# Patient Record
Sex: FEMALE | Race: OTHER | Employment: UNEMPLOYED | URBAN - METROPOLITAN AREA
[De-identification: names, ages, dates, MRNs, and addresses within clinical notes are randomized per-mention and may not be internally consistent; named-entity substitution may affect disease eponyms.]

---

## 2019-01-11 ENCOUNTER — INITIAL PRENATAL (OUTPATIENT)
Dept: FAMILY MEDICINE CLINIC | Age: 34
End: 2019-01-11

## 2019-01-11 VITALS
DIASTOLIC BLOOD PRESSURE: 75 MMHG | HEART RATE: 83 BPM | WEIGHT: 123 LBS | RESPIRATION RATE: 16 BRPM | OXYGEN SATURATION: 98 % | TEMPERATURE: 98.2 F | SYSTOLIC BLOOD PRESSURE: 111 MMHG

## 2019-01-11 DIAGNOSIS — Z34.00 SUPERVISION OF NORMAL FIRST PREGNANCY, ANTEPARTUM: Primary | ICD-10-CM

## 2019-01-11 LAB
BILIRUB UR QL STRIP: NEGATIVE
GLUCOSE UR-MCNC: NEGATIVE MG/DL
KETONES P FAST UR STRIP-MCNC: NEGATIVE MG/DL
PH UR STRIP: 7.5 [PH] (ref 4.6–8)
PROT UR QL STRIP: NEGATIVE
SP GR UR STRIP: 1.01 (ref 1–1.03)
UA UROBILINOGEN AMB POC: NORMAL (ref 0.2–1)
URINALYSIS CLARITY POC: NORMAL
URINALYSIS COLOR POC: YELLOW
URINE BLOOD POC: NEGATIVE
URINE LEUKOCYTES POC: NORMAL
URINE NITRITES POC: NEGATIVE

## 2019-01-11 NOTE — PROGRESS NOTES
History and Physical 
 
Patient: Ellis Armstrong MRN: [id number]  SSN: [id number] YOB: 1985  Age: 28 y.o. Sex: female Subjective:  
  
Ellis Armstrong is a 28 y.o. female  at 16.4 by LMP who presents for IOB visit. History reviewed. No pertinent past medical history. History reviewed. No pertinent surgical history. History reviewed. No pertinent family history. Social History Tobacco Use  Smoking status: Never Smoker  Smokeless tobacco: Never Used Substance Use Topics  Alcohol use: No  
  Frequency: Never Prior to Admission medications Medication Sig Start Date End Date Taking? Authorizing Provider PNV [id number]-[id number] 29 mg iron-1 mg -50 mg CPKD Take  by mouth. Yes Provider, Historical  
  
 
No Known Allergies Review of Systems: 
ROS negative except as noted in HPI. Objective:  
 
Vitals:  
 19 1637 BP: 111/75 Pulse: 83 Resp: 16 Temp: 98.2 °F (36.8 °C) TempSrc: Oral  
SpO2: 98% Weight: 123 lb (55.8 kg) Physical Exam: 
See prenatal physical exam. 
 
OB Dating Ultrasound Patient is a 28 y.o. No obstetric history on file. with an estimated gestational age of Unknown by LMP who presents for dating ultrasound. Eb [facility] 81 MEDICINE CTROFFICE PROCEDURE PROGRESS NOTE Chart reviewed for the following: 
 Vandana ETIENNE, DO, have reviewed the History, Physical and updated the Allergic reactions for Angelica Mcneil TIME OUT performed immediately prior to start of procedure: 
 Diya ETIENNE DO, have performed the following reviews on Ulda Leanne Mcneil prior to the start of the procedure: 
         
* Patient was identified by name and date of birth * Agreement on procedure being performed was verified * Risks and Benefits explained to the patient * Procedure site verified and marked as necessary * Patient was positioned for comfort * Consent was signed and verified Time: 4:45pm 
 Date of procedure: 2019 Procedure performed by: Diya Santoro DO How tolerated by patient: tolerated the procedure well with no complications Ultrasound type:  Transabdominal 
Findings: single IUP with +cardiac activity 155bpm, fetus active. Placenta location: posterior Fluid: grossly normal  
 
GA by LMP: 14w1d GA by AUA:  12w6d 
 
NIXON by ultrasound today IS NOT consistent with NIXON by LMP. CHANGE NIXON to: 19 Diya Santoro DO Assessment/Plan:  
32yo  @ 12w6d by Larry [place] 1. IUP: IOB labs today, will do pap at next visit. Undecided about aneuploidy screening. Will request anatomy at next visit. Needs flu vaccine next visit. Signed By: Hima Denton DO   
 2019

## 2019-01-14 LAB
ABO GROUP BLD: NORMAL
BASOPHILS # BLD AUTO: 0 X10E3/UL (ref 0–0.2)
BASOPHILS NFR BLD AUTO: 0 %
BLD GP AB SCN SERPL QL: NEGATIVE
EOSINOPHIL # BLD AUTO: 0.2 X10E3/UL (ref 0–0.4)
EOSINOPHIL NFR BLD AUTO: 2 %
ERYTHROCYTE [DISTWIDTH] IN BLOOD BY AUTOMATED COUNT: 13.3 % (ref 12.3–15.4)
HBV SURFACE AG SERPL QL IA: NEGATIVE
HCT VFR BLD AUTO: 39.9 % (ref 34–46.6)
HGB A MFR BLD: 97.3 % (ref 96.4–98.8)
HGB A2 MFR BLD COLUMN CHROM: 2.7 % (ref 1.8–3.2)
HGB BLD-MCNC: 13.3 G/DL (ref 11.1–15.9)
HGB C MFR BLD: 0 %
HGB F MFR BLD: 0 % (ref 0–2)
HGB FRACT BLD-IMP: NORMAL
HGB OTHER MFR BLD HPLC: 0 %
HGB S BLD QL SOLY: NEGATIVE
HGB S MFR BLD: 0 %
HIV 1+2 AB+HIV1 P24 AG SERPL QL IA: NON REACTIVE
IMM GRANULOCYTES # BLD AUTO: 0 X10E3/UL (ref 0–0.1)
IMM GRANULOCYTES NFR BLD AUTO: 0 %
LYMPHOCYTES # BLD AUTO: 2.3 X10E3/UL (ref 0.7–3.1)
LYMPHOCYTES NFR BLD AUTO: 23 %
MCH RBC QN AUTO: 30.9 PG (ref 26.6–33)
MCHC RBC AUTO-ENTMCNC: 33.3 G/DL (ref 31.5–35.7)
MCV RBC AUTO: 93 FL (ref 79–97)
MONOCYTES # BLD AUTO: 1 X10E3/UL (ref 0.1–0.9)
MONOCYTES NFR BLD AUTO: 10 %
NEUTROPHILS # BLD AUTO: 6.5 X10E3/UL (ref 1.4–7)
NEUTROPHILS NFR BLD AUTO: 65 %
PLATELET # BLD AUTO: 211 X10E3/UL (ref 150–379)
RBC # BLD AUTO: 4.3 X10E6/UL (ref 3.77–5.28)
RH BLD: POSITIVE
RPR SER QL: NON REACTIVE
RUBV IGG SERPL IA-ACNC: 2.97 INDEX
VZV IGG SER IA-ACNC: 449 INDEX
WBC # BLD AUTO: 10 X10E3/UL (ref 3.4–10.8)

## 2019-01-15 LAB — BACTERIA UR CULT: NORMAL

## 2019-02-08 ENCOUNTER — HOSPITAL ENCOUNTER (OUTPATIENT)
Dept: LAB | Age: 34
Discharge: HOME | End: 2019-02-08
Payer: SUBSIDIZED

## 2019-02-08 ENCOUNTER — ROUTINE PRENATAL (OUTPATIENT)
Dept: FAMILY MEDICINE CLINIC | Age: 34
End: 2019-02-08

## 2019-02-08 VITALS
HEIGHT: 57 IN | RESPIRATION RATE: 16 BRPM | SYSTOLIC BLOOD PRESSURE: 100 MMHG | WEIGHT: 124 LBS | DIASTOLIC BLOOD PRESSURE: 61 MMHG | OXYGEN SATURATION: 98 % | HEART RATE: 86 BPM | TEMPERATURE: 98.2 F | BODY MASS INDEX: 26.75 KG/M2

## 2019-02-08 DIAGNOSIS — Z34.80 SUPERVISION OF OTHER NORMAL PREGNANCY, ANTEPARTUM: Primary | ICD-10-CM

## 2019-02-08 DIAGNOSIS — Z23 ENCOUNTER FOR IMMUNIZATION: ICD-10-CM

## 2019-02-08 LAB
BILIRUB UR QL STRIP: NEGATIVE
GLUCOSE UR-MCNC: NEGATIVE MG/DL
KETONES P FAST UR STRIP-MCNC: NORMAL MG/DL
PH UR STRIP: 5.5 [PH] (ref 4.6–8)
PROT UR QL STRIP: NEGATIVE
SP GR UR STRIP: 1.03 (ref 1–1.03)
UA UROBILINOGEN AMB POC: NORMAL (ref 0.2–1)
URINALYSIS CLARITY POC: NORMAL
URINALYSIS COLOR POC: YELLOW
URINE BLOOD POC: NEGATIVE
URINE LEUKOCYTES POC: NEGATIVE
URINE NITRITES POC: NEGATIVE

## 2019-02-08 PROCEDURE — 88175 CYTOPATH C/V AUTO FLUID REDO: CPT

## 2019-02-08 PROCEDURE — 87624 HPV HI-RISK TYP POOLED RSLT: CPT

## 2019-02-08 PROCEDURE — 87491 CHLMYD TRACH DNA AMP PROBE: CPT

## 2019-02-08 NOTE — PROGRESS NOTES
Chief Complaint   Patient presents with    Routine Prenatal Visit     Blood pressure 100/61, pulse 86, temperature 98.2 °F (36.8 °C), temperature source Oral, resp. rate 16, weight 124 lb (56.2 kg), last menstrual period 10/04/2018, SpO2 98 %. 1. Have you been to the ER, urgent care clinic since your last visit? Hospitalized since your last visit? No    2. Have you seen or consulted any other health care providers outside of the [facility] since your last visit? Include any pap smears or colon screening. No    Patient denies any bleeding,cramping or leakage of fluid.

## 2019-02-08 NOTE — PROGRESS NOTES
Return OB Visit       Subjective:   Angelica Levin 28 y.o.  16w6d. NIXON: 2019, by Ultrasound      Reports good FM, no VB, LOF or contractions. Objective:   /61   Pulse 86   Temp 98.2 °F (36.8 °C) (Oral)   Resp 16   Ht 4' 9.48\" (1.46 m)   Wt 124 lb (56.2 kg)   LMP 10/04/2018 (Exact Date)   SpO2 98%   BMI 26.39 kg/m²     Physical Exam:  SEE FLOWSHEET  Cervix very friable on exam.  12:00 portion with bleeding vessel that I used silver nitrate stick on to stop. Labs  Recent Results (from the past 12 hour(s))   AMB POC URINALYSIS DIP STICK AUTO W/O MICRO    Collection Time: 19  3:25 PM   Result Value Ref Range    Color (UA POC) Yellow     Clarity (UA POC) Slightly Cloudy     Glucose (UA POC) Negative Negative    Bilirubin (UA POC) Negative Negative    Ketones (UA POC) Trace Negative    Specific gravity (UA POC) 1.030 1.001 - 1.035    Blood (UA POC) Negative Negative    pH (UA POC) 5.5 4.6 - 8.0    Protein (UA POC) Negative Negative    Urobilinogen (UA POC) 1 mg/dL 0.2 - 1    Nitrites (UA POC) Negative Negative    Leukocyte esterase (UA POC) Negative Negative         Assessment       ICD-10-CM ICD-9-CM    1. Supervision of other normal pregnancy, antepartum Z34.80 V22.1 AMB POC URINALYSIS DIP STICK AUTO W/O MICRO      PAP IG,CT-NG, APTIMA HPV AND RFX 16/18,45 ([id number])     Plan   34yo  @ 16w6d by 12.6wk sono  1. IUP: IOB labs normal, RH pos, pap today. Declines aneuploidy screen. Requesting anatomy. S/p flu vaccine. Counseled pt about very friable cervix with pap. Bleeding precautions discussed. Orders Placed This Encounter    AMB POC URINALYSIS DIP STICK AUTO W/O MICRO    PAP IG,CT-NG, APTIMA HPV AND RFX 11/44,81 ([id number])     Order Specific Question:   Pap Source? Answer:   Cervical     Order Specific Question:   Total Hysterectomy? Answer:   No     Order Specific Question:   Supracervical Hysterectomy?      Answer:   No     Order Specific Question:   Post Menopausal?     Answer:   No     Order Specific Question:   Hormone Therapy? Answer:   No     Order Specific Question:   IUD? Answer:   No     Order Specific Question:   Abnormal Bleeding? Answer:   No     Order Specific Question:   Pregnant     Answer:   Yes     Order Specific Question:   Post Partum? Answer:   No         Labor precautions discussed, including: Regular painful contractions, lasting for greater than one hour, taking your breath away; any vaginal bleeding; any leakage of fluid; or absent or decreased fetal movement. Call M.D. on call if any of these symptoms or signs occur. I have discussed the diagnosis with the patient and the intended plan as seen in the above orders. The patient has received an after-visit summary and questions were answered concerning future plans. I have discussed medication side effects and warnings with the patient as well. Informed pt to return to the office or go to the ER if she experiences vaginal bleeding, vaginal discharge, leaking of fluid, pelvic cramping.       Diya Santoro, DO

## 2019-02-12 ENCOUNTER — HOSPITAL ENCOUNTER (EMERGENCY)
Age: 34
Discharge: HOME | End: 2019-02-12
Attending: EMERGENCY MEDICINE
Payer: SUBSIDIZED

## 2019-02-12 ENCOUNTER — APPOINTMENT (OUTPATIENT)
Dept: ULTRASOUND IMAGING | Age: 34
End: 2019-02-12
Attending: EMERGENCY MEDICINE
Payer: SUBSIDIZED

## 2019-02-12 VITALS
WEIGHT: 124 LBS | BODY MASS INDEX: 26.39 KG/M2 | DIASTOLIC BLOOD PRESSURE: 79 MMHG | RESPIRATION RATE: 14 BRPM | OXYGEN SATURATION: 99 % | TEMPERATURE: 99 F | SYSTOLIC BLOOD PRESSURE: 135 MMHG | HEART RATE: 111 BPM

## 2019-02-12 DIAGNOSIS — O46.90 VAGINAL BLEEDING DURING PREGNANCY: Primary | ICD-10-CM

## 2019-02-12 LAB
ALBUMIN SERPL-MCNC: 3.3 G/DL (ref 3.5–5)
ALBUMIN/GLOB SERPL: 1 {RATIO} (ref 1.1–2.2)
ALP SERPL-CCNC: 67 U/L (ref 45–117)
ALT SERPL-CCNC: 39 U/L (ref 12–78)
ANION GAP SERPL CALC-SCNC: 14 MMOL/L (ref 5–15)
APPEARANCE UR: CLEAR
AST SERPL-CCNC: 20 U/L (ref 15–37)
BACTERIA URNS QL MICRO: NEGATIVE /HPF
BASOPHILS # BLD: 0 K/UL (ref 0–0.1)
BASOPHILS NFR BLD: 0 % (ref 0–1)
BILIRUB SERPL-MCNC: 0.2 MG/DL (ref 0.2–1)
BILIRUB UR QL: NEGATIVE
BUN SERPL-MCNC: 7 MG/DL (ref 6–20)
BUN/CREAT SERPL: 9 (ref 12–20)
C TRACH RRNA SPEC QL NAA+PROBE: NEGATIVE
CALCIUM SERPL-MCNC: 9.1 MG/DL (ref 8.5–10.1)
CHLORIDE SERPL-SCNC: 103 MMOL/L (ref 97–108)
CO2 SERPL-SCNC: 23 MMOL/L (ref 21–32)
COLOR UR: ABNORMAL
COMMENT, HOLDF: NORMAL
CREAT SERPL-MCNC: 0.74 MG/DL (ref 0.55–1.02)
DIFFERENTIAL METHOD BLD: ABNORMAL
EOSINOPHIL # BLD: 0.1 K/UL (ref 0–0.4)
EOSINOPHIL NFR BLD: 1 % (ref 0–7)
EPITH CASTS URNS QL MICRO: ABNORMAL /LPF
ERYTHROCYTE [DISTWIDTH] IN BLOOD BY AUTOMATED COUNT: 12.4 % (ref 11.5–14.5)
GLOBULIN SER CALC-MCNC: 3.4 G/DL (ref 2–4)
GLUCOSE SERPL-MCNC: 126 MG/DL (ref 65–100)
GLUCOSE UR STRIP.AUTO-MCNC: NEGATIVE MG/DL
HCG SERPL-ACNC: 9058 MIU/ML (ref 0–6)
HCT VFR BLD AUTO: 36.5 % (ref 35–47)
HGB BLD-MCNC: 12.6 G/DL (ref 11.5–16)
HGB UR QL STRIP: ABNORMAL
HYALINE CASTS URNS QL MICRO: ABNORMAL /LPF (ref 0–5)
IMM GRANULOCYTES # BLD AUTO: 0.1 K/UL (ref 0–0.04)
IMM GRANULOCYTES NFR BLD AUTO: 1 % (ref 0–0.5)
KETONES UR QL STRIP.AUTO: NEGATIVE MG/DL
LEUKOCYTE ESTERASE UR QL STRIP.AUTO: ABNORMAL
LIPASE SERPL-CCNC: 229 U/L (ref 73–393)
LYMPHOCYTES # BLD: 1.5 K/UL (ref 0.8–3.5)
LYMPHOCYTES NFR BLD: 16 % (ref 12–49)
MCH RBC QN AUTO: 31.5 PG (ref 26–34)
MCHC RBC AUTO-ENTMCNC: 34.5 G/DL (ref 30–36.5)
MCV RBC AUTO: 91.3 FL (ref 80–99)
MONOCYTES # BLD: 0.6 K/UL (ref 0–1)
MONOCYTES NFR BLD: 6 % (ref 5–13)
N GONORRHOEA RRNA SPEC QL NAA+PROBE: NEGATIVE
NEUTS SEG # BLD: 7.5 K/UL (ref 1.8–8)
NEUTS SEG NFR BLD: 76 % (ref 32–75)
NITRITE UR QL STRIP.AUTO: NEGATIVE
NRBC # BLD: 0 K/UL (ref 0–0.01)
NRBC BLD-RTO: 0 PER 100 WBC
PH UR STRIP: 7 [PH] (ref 5–8)
PLATELET # BLD AUTO: 207 K/UL (ref 150–400)
PMV BLD AUTO: 10.4 FL (ref 8.9–12.9)
POTASSIUM SERPL-SCNC: 3.3 MMOL/L (ref 3.5–5.1)
PROT SERPL-MCNC: 6.7 G/DL (ref 6.4–8.2)
PROT UR STRIP-MCNC: NEGATIVE MG/DL
RBC # BLD AUTO: 4 M/UL (ref 3.8–5.2)
RBC #/AREA URNS HPF: ABNORMAL /HPF (ref 0–5)
SAMPLES BEING HELD,HOLD: NORMAL
SODIUM SERPL-SCNC: 140 MMOL/L (ref 136–145)
SP GR UR REFRACTOMETRY: 1.01 (ref 1–1.03)
SPECIMEN SOURCE: NORMAL
UR CULT HOLD, URHOLD: NORMAL
UROBILINOGEN UR QL STRIP.AUTO: 1 EU/DL (ref 0.2–1)
WBC # BLD AUTO: 9.8 K/UL (ref 3.6–11)
WBC URNS QL MICRO: ABNORMAL /HPF (ref 0–4)

## 2019-02-12 PROCEDURE — 85025 COMPLETE CBC W/AUTO DIFF WBC: CPT

## 2019-02-12 PROCEDURE — 83690 ASSAY OF LIPASE: CPT

## 2019-02-12 PROCEDURE — 86900 BLOOD TYPING SEROLOGIC ABO: CPT

## 2019-02-12 PROCEDURE — 99282 EMERGENCY DEPT VISIT SF MDM: CPT

## 2019-02-12 PROCEDURE — 80053 COMPREHEN METABOLIC PANEL: CPT

## 2019-02-12 PROCEDURE — 84702 CHORIONIC GONADOTROPIN TEST: CPT

## 2019-02-12 PROCEDURE — 76815 OB US LIMITED FETUS(S): CPT

## 2019-02-12 PROCEDURE — 36415 COLL VENOUS BLD VENIPUNCTURE: CPT

## 2019-02-12 PROCEDURE — 81001 URINALYSIS AUTO W/SCOPE: CPT

## 2019-02-12 NOTE — ED PROVIDER NOTES
HPI  
 
  27y F approx 16w gestation here with vaginal bleeding for the past 4 days. No dizziness or syncope. Some abdominal cramping as well. No problems with pregnancy up until this point. No fever. No rash. No back or flank pain. No urinary complaints. No past medical history on file. No past surgical history on file. No family history on file. Social History Socioeconomic History  Marital status: UNKNOWN Spouse name: Not on file  Number of children: Not on file  Years of education: Not on file  Highest education level: Not on file Social Needs  Financial resource strain: Not on file  Food insecurity - worry: Not on file  Food insecurity - inability: Not on file  Transportation needs - medical: Not on file  Transportation needs - non-medical: Not on file Occupational History  Not on file Tobacco Use  Smoking status: Never Smoker  Smokeless tobacco: Never Used Substance and Sexual Activity  Alcohol use: No  
  Frequency: Never  Drug use: No  
 Sexual activity: Yes Other Topics Concern  Not on file Social History Narrative  Not on file ALLERGIES: Patient has no known allergies. Review of Systems Review of Systems Constitutional: (-) weight loss. HEENT: (-) stiff neck Eyes: (-) discharge. Respiratory: (-) cough. Cardiovascular: (-) syncope. Gastrointestinal: (-) blood in stool. Genitourinary: (-) hematuria. Musculoskeletal: (-) myalgias. Neurological: (-) seizure. Skin: (-) petechiae Lymph/Immunologic: (-) enlarged lymph nodes All other systems reviewed and are negative. Vitals:  
 02/12/19 1547 BP: 135/79 Pulse: (!) 111 Resp: 14 Temp: 99 °F (37.2 °C) SpO2: 99% Weight: 56.2 kg (124 lb) Physical Exam Nursing note and vitals reviewed. Constitutional: oriented to person, place, and time. appears well-developed and well-nourished. No distress. Head: Normocephalic and atraumatic. Sclera anicteric Nose: No rhinorrhea Mouth/Throat: Oropharynx is clear and moist. Pharynx normal 
Eyes: Conjunctivae are normal. Pupils are equal, round, and reactive to light. Right eye exhibits no discharge. Left eye exhibits no discharge. Neck: Painless normal range of motion. Neck supple. No LAD. Cardiovascular: Normal rate, regular rhythm, normal heart sounds and intact distal pulses. Exam reveals no gallop and no friction rub. No murmur heard. Pulmonary/Chest:  No respiratory distress. No wheezes. No rales. No rhonchi. No increased work of breathing. No accessory muscle use. Good air exchange throughout. Abdominal: soft, gravid, non-tender, no rebound or guarding. No hepatosplenomegaly. Normal bowel sounds throughout. Back: no tenderness to palpation, no deformities, no CVA tenderness Extremities/Musculoskeletal: Normal range of motion. no tenderness. No edema. Distal extremities are neurovasc intact. Lymphadenopathy:   No adenopathy. Neurological:  Alert and oriented to person, place, and time. Coordination normal. CN 2-12 intact. Motor and sensory function intact. Skin: Skin is warm and dry. No rash noted. No pallor. MDM 27y F here with vaginal bleeding. Is 16w pregnant. Will check labs, ultrasound, and reeval. 
  
 
Procedures PELVIC: female nurse chaperone present. Normal external genitalia. No blood or discharge in the vaginal vault. No discharge at the os but small amount of blood present. No uterine masses/tenderness. No adnexal/ovarian masses/tenderness. No CMT.

## 2019-02-12 NOTE — DISCHARGE INSTRUCTIONS
Patient Education        Sangrado vaginal hilda el Adolm Aragon: Instrucciones de cuidado - [ Vaginal Bleeding During Pregnancy: Care Instructions ]  Instrucciones de cuidado    Muchas mujeres tienen algo de sangrado vaginal hilda el embarazo. En algunos casos, el sangrado no es grave. Y no tendrá ningún otro problema con chahal embarazo. Fabiná en ocasiones, el sangrado es señal de un problema más grave. [place] es más común si el sangrado es intenso o doloroso. Curlee Geralds de problemas más graves son un aborto espontáneo, un embarazo ectópico o un problema con la placenta. Es posible que tenga que swati a chahal médico otra vez para asegurarse de que todo esté lanie. Adam vez necesite, además, más pruebas para determinar la causa del sangrado. Para algunas mujeres, el tratamiento en el hogar es todo lo que se requiere. Fabián esto depende de la causa del sangrado. Asegúrese de informar a chahal médico si tiene síntomas nuevos o si los síntomas empeoran. El médico la ha examinado minuciosamente, fabián pueden producirse problemas más tarde. Si nota algún problema o nuevos síntomas, busque tratamiento médico de inmediato. La atención de seguimiento es diego parte clave de chahal tratamiento y seguridad. Asegúrese de hacer y acudir a todas las citas, y llame a chahal médico si está teniendo problemas. También es diego buena idea saber los resultados de izzy exámenes y mantener diego lista de los medicamentos que kiko. ¿Cómo puede cuidarse en el hogar? · Si chahal médico le recetó medicamentos, tómelos exactamente [employer] indicados. Llame a chahal médico si piensa que está teniendo un problema con chahal medicamento. · No tenga relaciones sexuales hasta que cese el sangrado y chahal médico diga que puede [place]. · Consulte a chahal médico acerca de otras actividades que usted puede o no puede hacer. · Descanse bastante. El [employer] que se sienta cansada. · Siga diego dieta saludable.  Roxianne Wei (chícharos), frijoles y verduras de [employer] allan en abundancia. Hable con un dietista si necesita ayuda para planificar chahal dieta. · No use medicamentos antiinflamatorios no esteroideos (FUNMI), tales last ibuprofeno (Advil, Motrin), naproxeno (Aleve) o aspirina, a menos que chahal médico lo apruebe. ¿Cuándo debe pedir ayuda? Llame al 911 en cualquier momento que considere que necesita atención de [place]. Por ejemplo, llame si:    · Tiene dolor repentino e intenso en el abdomen.     · Se desmayó (perdió el conocimiento).     · Tiene sangrado vaginal intenso.    Llame a chahal médico ahora mismo o busque atención médica inmediata si:    · Siente mareos o aturdimiento, o que está a punto de desmayarse.     · Tiene nuevo o peor dolor en el abdomen o la pelvis.     · Chahal sangrado vaginal está empeorando.     · Tiene dolor que ha aumentado en la gael vaginal.     · Tiene fiebre.    Vigile de cerca los cambios en chahal nettie y asegúrese de comunicarse con chahal médico si:    · Tiene secreción vaginal nueva o peor.     · No mejora last se esperaba. ¿Dónde puede encontrar más información en inglés? Zane Mead a http://teri-erlinda.info/. Michelle Patrick [phone] en la búsqueda para aprender más acerca de \"Sangrado vaginal hilda el embarazo: Instrucciones de cuidado - [ Vaginal Bleeding During Pregnancy: Care Instructions ]. \"  Revisado: 5 septiembre, 2018  Versión del contenido: 11.9  © 4209-1672 [employer]. Las instrucciones de cuidado fueron adaptadas bajo licencia por [employer] (which disclaims liability or warranty for this information). Si usted tiene [place] afección médica o sobre estas instrucciones, siempre pregunte a chahal profesional de nettie. [facility] niega toda garantía o responsabilidad por chahal uso de esta información.

## 2019-02-13 LAB
ABO + RH BLD: NORMAL
BLOOD GROUP ANTIBODIES SERPL: NORMAL
SPECIMEN EXP DATE BLD: NORMAL

## 2019-02-22 ENCOUNTER — TELEPHONE (OUTPATIENT)
Dept: FAMILY MEDICINE CLINIC | Age: 34
End: 2019-02-22

## 2019-02-22 NOTE — TELEPHONE ENCOUNTER
Attempted to call patient using [employer]  [phone] (Kiswahili) to notify patient of MFM appointment. Left voicemail for patient to return call.   
 
If patient returns call inform patient MFM appointment is March 6 at 2:15pm at [facility].

## 2019-03-08 ENCOUNTER — ROUTINE PRENATAL (OUTPATIENT)
Dept: FAMILY MEDICINE CLINIC | Age: 34
End: 2019-03-08

## 2019-03-08 VITALS
WEIGHT: 128 LBS | BODY MASS INDEX: 27.61 KG/M2 | HEIGHT: 57 IN | DIASTOLIC BLOOD PRESSURE: 71 MMHG | SYSTOLIC BLOOD PRESSURE: 113 MMHG | OXYGEN SATURATION: 98 % | TEMPERATURE: 98 F | RESPIRATION RATE: 16 BRPM | HEART RATE: 85 BPM

## 2019-03-08 DIAGNOSIS — Z3A.20 20 WEEKS GESTATION OF PREGNANCY: Primary | ICD-10-CM

## 2019-03-08 LAB
BILIRUB UR QL STRIP: NEGATIVE
GLUCOSE UR-MCNC: NEGATIVE MG/DL
KETONES P FAST UR STRIP-MCNC: NEGATIVE MG/DL
PH UR STRIP: 6 [PH] (ref 4.6–8)
PROT UR QL STRIP: NEGATIVE
SP GR UR STRIP: 1.02 (ref 1–1.03)
UA UROBILINOGEN AMB POC: NORMAL (ref 0.2–1)
URINALYSIS CLARITY POC: CLEAR
URINALYSIS COLOR POC: YELLOW
URINE BLOOD POC: NEGATIVE
URINE LEUKOCYTES POC: NORMAL
URINE NITRITES POC: NEGATIVE

## 2019-03-08 NOTE — PROGRESS NOTES
Return OB Visit   Went to ED 4 days after her visit here last time (when she had a very friable cervix with bleeding on pap). Currently no bleeding. Has some ligament pain. Objective:   /71 (BP 1 Location: Left arm, BP Patient Position: Sitting)   Pulse 85   Temp 98 °F (36.7 °C) (Oral)   Resp 16   Ht 4' 9.48\" (1.46 m)   Wt 128 lb (58.1 kg)   LMP 10/04/2018 (Exact Date)   SpO2 98%   BMI 27.24 kg/m²       See flowsheet   Assessment       ICD-10-CM ICD-9-CM    1. 20 weeks gestation of pregnancy Z3A.20 V22.2 AMB POC URINALYSIS DIP STICK AUTO W/O MICRO     Plan   32yo  @ 20w6d by 12.6wk sono  1. IUP: RH pos. Declined aneuploidy screen. Missed anatomy, rescheduled to 3/18. S/p flu vaccine.       Orders Placed This Encounter    AMB POC URINALYSIS DIP STICK AUTO W/O MICRO         Diya Santoro DO

## 2019-03-08 NOTE — PROGRESS NOTES
Chief Complaint   Patient presents with    Routine Prenatal Visit     Leakage of Fluid: NO  Vaginal Bleeding: NO  Fetal Movement: YES  Prenatal vitamins: YES  Having Contractions: NO  Pain: Yes when walking, back pain at night    1. Have you been to the ER, urgent care clinic since your last visit? Hospitalized since your last visit? No    2. Have you seen or consulted any other health care providers outside of the [address] [place] [address] since your last visit? Include any pap smears or colon screening.  No    Visit Vitals  Ht 4' 9.48\" (1.46 m)   LMP 10/04/2018 (Exact Date)   BMI 26.39 kg/m²

## 2019-04-04 ENCOUNTER — HOSPITAL ENCOUNTER (OUTPATIENT)
Dept: PERINATAL CARE | Age: 34
Discharge: HOME | End: 2019-04-04
Attending: OBSTETRICS & GYNECOLOGY
Payer: SUBSIDIZED

## 2019-04-04 PROCEDURE — 76805 OB US >/= 14 WKS SNGL FETUS: CPT | Performed by: OBSTETRICS & GYNECOLOGY

## 2019-04-26 ENCOUNTER — ROUTINE PRENATAL (OUTPATIENT)
Dept: FAMILY MEDICINE CLINIC | Age: 34
End: 2019-04-26

## 2019-04-26 VITALS
TEMPERATURE: 98.3 F | WEIGHT: 134 LBS | HEIGHT: 57 IN | DIASTOLIC BLOOD PRESSURE: 60 MMHG | HEART RATE: 80 BPM | OXYGEN SATURATION: 98 % | RESPIRATION RATE: 16 BRPM | SYSTOLIC BLOOD PRESSURE: 97 MMHG | BODY MASS INDEX: 28.91 KG/M2

## 2019-04-26 DIAGNOSIS — Z3A.27 27 WEEKS GESTATION OF PREGNANCY: Primary | ICD-10-CM

## 2019-04-26 LAB
BILIRUB UR QL STRIP: NEGATIVE
GLUCOSE UR-MCNC: NEGATIVE MG/DL
KETONES P FAST UR STRIP-MCNC: NORMAL MG/DL
PH UR STRIP: 6.5 [PH] (ref 4.6–8)
PROT UR QL STRIP: NEGATIVE
SP GR UR STRIP: 1.02 (ref 1–1.03)
UA UROBILINOGEN AMB POC: NORMAL (ref 0.2–1)
URINALYSIS CLARITY POC: CLEAR
URINALYSIS COLOR POC: YELLOW
URINE BLOOD POC: NEGATIVE
URINE LEUKOCYTES POC: NORMAL
URINE NITRITES POC: NEGATIVE

## 2019-04-26 NOTE — PROGRESS NOTES
Identified Patient with two Patient identifiers (Name and ). Two Patient Identifiers confirmed. Reviewed record in preparation for visit and have obtained necessary documentation. Chief Complaint   Patient presents with    Routine Prenatal Visit     27w6d       Visit Vitals  BP 97/60 (BP 1 Location: Left arm, BP Patient Position: Sitting)   Pulse 80   Temp 98.3 °F (36.8 °C) (Oral)   Resp 16   Ht 4' 9.4\" (1.458 m)   Wt 134 lb (60.8 kg)   SpO2 98%   BMI 28.59 kg/m²       1. Have you been to the ER, urgent care clinic since your last visit? Hospitalized since your last visit? No    2. Have you seen or consulted any other health care providers outside of the [address] [place] [address] since your last visit? Include any pap smears or colon screening. No     + fetal movement. Patient denies any vaginal bleeding, LOF, or abnormal d/c.

## 2019-04-26 NOTE — PROGRESS NOTES
Return OB Visit   MSK over pubic bone. No urinary sx. Objective:   BP 97/60 (BP 1 Location: Left arm, BP Patient Position: Sitting)   Pulse 80   Temp 98.3 °F (36.8 °C) (Oral)   Resp 16   Ht 4' 9.4\" (1.458 m)   Wt 134 lb (60.8 kg)   LMP 10/04/2018 (Exact Date)   SpO2 98%   BMI 28.59 kg/m²   Estimated Date of Delivery: 19      See flowsheet   Assessment       ICD-10-CM ICD-9-CM    1. 27 weeks gestation of pregnancy Z3A.27 V22.2 AMB POC URINALYSIS DIP STICK AUTO W/O MICRO      GLUCOSE, GESTATIONAL 1 HR TOLERANCE      TETANUS, DIPHTHERIA TOXOIDS AND ACELLULAR PERTUSSIS VACCINE (TDAP), IN INDIVIDS. >=7, IM      NJ IMMUNIZ ADMIN,1 SINGLE/COMB VAC/TOXOID     Plan   34yo  @ 27w6d by 12.6wk sono  1. IUP: RH pos. Declined aneuploidy screen, Normal anatomy. S/p flu and tdap vaccine.   GTT today     Orders Placed This Encounter    [place] IMMUNIZ ADMIN,1 SINGLE/COMB VAC/TOXOID    TETANUS, DIPHTHERIA TOXOIDS AND ACELLULAR PERTUSSIS VACCINE (TDAP), IN INDIVIDS. >=7, IM    GLUCOSE, GESTATIONAL 1 HR TOLERANCE    AMB POC URINALYSIS DIP STICK AUTO W/O MICRO         Diya Santoro,

## 2019-04-27 LAB — GLUCOSE 1H P 50 G GLC PO SERPL-MCNC: 115 MG/DL (ref 65–139)

## 2019-05-15 ENCOUNTER — ROUTINE PRENATAL (OUTPATIENT)
Dept: FAMILY MEDICINE CLINIC | Age: 34
End: 2019-05-15

## 2019-05-15 VITALS
TEMPERATURE: 98 F | DIASTOLIC BLOOD PRESSURE: 61 MMHG | SYSTOLIC BLOOD PRESSURE: 104 MMHG | OXYGEN SATURATION: 98 % | HEIGHT: 57 IN | HEART RATE: 79 BPM | RESPIRATION RATE: 16 BRPM | BODY MASS INDEX: 29.34 KG/M2 | WEIGHT: 136 LBS

## 2019-05-15 DIAGNOSIS — Z34.80 SUPERVISION OF OTHER NORMAL PREGNANCY, ANTEPARTUM: Primary | ICD-10-CM

## 2019-05-15 DIAGNOSIS — Z3A.30 30 WEEKS GESTATION OF PREGNANCY: ICD-10-CM

## 2019-05-15 LAB
BILIRUB UR QL STRIP: NEGATIVE
GLUCOSE UR-MCNC: NEGATIVE MG/DL
KETONES P FAST UR STRIP-MCNC: NEGATIVE MG/DL
PH UR STRIP: 7 [PH] (ref 4.6–8)
PROT UR QL STRIP: NEGATIVE
SP GR UR STRIP: 1.01 (ref 1–1.03)
UA UROBILINOGEN AMB POC: NORMAL (ref 0.2–1)
URINALYSIS CLARITY POC: CLEAR
URINALYSIS COLOR POC: YELLOW
URINE BLOOD POC: NEGATIVE
URINE LEUKOCYTES POC: NORMAL
URINE NITRITES POC: NEGATIVE

## 2019-05-15 NOTE — PROGRESS NOTES
Chief Complaint   Patient presents with    Routine Prenatal Visit     Leakage of Fluid: NO  Vaginal Bleeding: NO  Fetal Movement: YES  Prenatal vitamins: YES  Having Contractions: NO  Pain: NO    Visit Vitals  /61 (BP 1 Location: Left arm, BP Patient Position: Sitting)   Pulse 79   Temp 98 °F (36.7 °C) (Oral)   Resp 16   Ht 4' 9.4\" (1.458 m)   Wt 136 lb (61.7 kg)   LMP 10/04/2018 (Exact Date)   SpO2 98%   BMI 29.02 kg/m²

## 2019-05-15 NOTE — PROGRESS NOTES
Return OB Visit   No complaints. Baby very active. Objective:   /61 (BP 1 Location: Left arm, BP Patient Position: Sitting)   Pulse 79   Temp 98 °F (36.7 °C) (Oral)   Resp 16   Ht 4' 9.4\" (1.458 m)   Wt 136 lb (61.7 kg)   LMP 10/04/2018 (Exact Date)   SpO2 98%   BMI 29.02 kg/m²   Estimated Date of Delivery: 19      See flowsheet   Assessment       ICD-10-CM ICD-9-CM    1. Supervision of other normal pregnancy, antepartum Z34.80 V22.1      Plan   34yo  @ 30w4d by 12.6wk sono  1. IUP: RH pos. Declined aneuploidy screen, Normal anatomy. S/p flu and tdap vaccine. GTT WNL. Third tri CBC next visit. No orders of the defined types were placed in this encounter.         Diya Santoro, DO

## 2019-06-06 ENCOUNTER — ROUTINE PRENATAL (OUTPATIENT)
Dept: FAMILY MEDICINE CLINIC | Age: 34
End: 2019-06-06

## 2019-06-06 VITALS
OXYGEN SATURATION: 98 % | HEIGHT: 57 IN | DIASTOLIC BLOOD PRESSURE: 65 MMHG | RESPIRATION RATE: 18 BRPM | TEMPERATURE: 98.5 F | BODY MASS INDEX: 29.77 KG/M2 | SYSTOLIC BLOOD PRESSURE: 101 MMHG | WEIGHT: 138 LBS | HEART RATE: 91 BPM

## 2019-06-06 DIAGNOSIS — Z3A.33 33 WEEKS GESTATION OF PREGNANCY: Primary | ICD-10-CM

## 2019-06-06 LAB
BILIRUB UR QL STRIP: NEGATIVE
GLUCOSE UR-MCNC: NEGATIVE MG/DL
KETONES P FAST UR STRIP-MCNC: NEGATIVE MG/DL
PH UR STRIP: 6 [PH] (ref 4.6–8)
PROT UR QL STRIP: NEGATIVE
SP GR UR STRIP: 1.02 (ref 1–1.03)
UA UROBILINOGEN AMB POC: NORMAL (ref 0.2–1)
URINALYSIS CLARITY POC: NORMAL
URINALYSIS COLOR POC: YELLOW
URINE BLOOD POC: NEGATIVE
URINE LEUKOCYTES POC: NORMAL
URINE NITRITES POC: NEGATIVE

## 2019-06-06 NOTE — PROGRESS NOTES
I reviewed with the resident the medical history and the resident's findings on the physical examination. I discussed with the resident the patient's diagnosis and concur with the plan. Deonte Hatch is a 28 y.o. female  at 33w5d. presents for routine PNC. 2019, by Ultrasound  Concerns addressed: Routine PNC  Pregnancy complicated by:   Denies VB, LOF, Contractions. + Fetal movement. Weight gain reviewed.   RTC in 2 weeks    Visit Vitals  /65   Pulse 91   Temp 98.5 °F (36.9 °C) (Oral)   Resp 18   Ht 4' 9.4\" (1.458 m)   Wt 138 lb (62.6 kg)   LMP 10/04/2018 (Exact Date)   SpO2 98%   BMI 29.45 kg/m²     FHT: 130  FH: 34cm    Recent Results (from the past 24 hour(s))   AMB POC URINALYSIS DIP STICK AUTO W/O MICRO    Collection Time: 19  9:40 AM   Result Value Ref Range    Color (UA POC) Yellow     Clarity (UA POC) Slightly Cloudy     Glucose (UA POC) Negative Negative    Bilirubin (UA POC) Negative Negative    Ketones (UA POC) Negative Negative    Specific gravity (UA POC) 1.025 1.001 - 1.035    Blood (UA POC) Negative Negative    pH (UA POC) 6.0 4.6 - 8.0    Protein (UA POC) Negative Negative    Urobilinogen (UA POC) 0.2 mg/dL 0.2 - 1    Nitrites (UA POC) Negative Negative    Leukocyte esterase (UA POC) 2+ Negative

## 2019-06-06 NOTE — PROGRESS NOTES
Return OB Visit       Subjective:   Angelica Medrano 28 y.o.   NIXON: 2019, by Ultrasound  GA:  33w5d. +FM. AUBREY Vargas, juliocesar giron interpreter #: [id number]    Allergies- reviewed:   No Known Allergies      Medications- reviewed:   Current Outpatient Medications   Medication Sig    PNV No12-Iron-FA-DSS-OM-3 29 mg iron-1 mg -50 mg CPKD Take  by mouth. No current facility-administered medications for this visit. Past Medical History- reviewed:  History reviewed. No pertinent past medical history. Past Surgical History- reviewed:   History reviewed. No pertinent surgical history.       Social History- reviewed:  Social History     Socioeconomic History    Marital status: UNKNOWN     Spouse name: Not on file    Number of children: Not on file    Years of education: Not on file    Highest education level: Not on file   Occupational History    Not on file   Social Needs    Financial resource strain: Not on file    Food insecurity:     Worry: Not on file     Inability: Not on file    Transportation needs:     Medical: Not on file     Non-medical: Not on file   Tobacco Use    Smoking status: Never Smoker    Smokeless tobacco: Never Used   Substance and Sexual Activity    Alcohol use: No     Frequency: Never    Drug use: No    Sexual activity: Yes     Partners: Male   Lifestyle    Physical activity:     Days per week: Not on file     Minutes per session: Not on file    Stress: Not on file   Relationships    Social connections:     Talks on phone: Not on file     Gets together: Not on file     Attends Evangelical service: Not on file     Active member of club or organization: Not on file     Attends meetings of clubs or organizations: Not on file     Relationship status: Not on file    Intimate partner violence:     Fear of current or ex partner: Not on file     Emotionally abused: Not on file     Physically abused: Not on file     Forced sexual activity: Not on file   Other Topics Concern    Not on file   Social History Narrative    Not on file         Immunizations- reviewed:   Immunization History   Administered Date(s) Administered    Influenza Vaccine (Quad) PF 2019    Tdap 2019       Objective:     Visit Vitals  /65   Pulse 91   Temp 98.5 °F (36.9 °C) (Oral)   Resp 18   Ht 4' 9.4\" (1.458 m)   Wt 138 lb (62.6 kg)   LMP 10/04/2018 (Exact Date)   SpO2 98%   BMI 29.45 kg/m²       Physical Exam:  GENERAL APPEARANCE: alert, well appearing, in no apparent distress  ABDOMEN: soft, nontender, nondistended, no abnormal masses, no epigastric pain, bowel sounds present, fundal height 33cm, FHT present at 130 bpm    Labs  Recent Results (from the past 12 hour(s))   AMB POC URINALYSIS DIP STICK AUTO W/O MICRO    Collection Time: 19  9:40 AM   Result Value Ref Range    Color (UA POC) Yellow     Clarity (UA POC) Slightly Cloudy     Glucose (UA POC) Negative Negative    Bilirubin (UA POC) Negative Negative    Ketones (UA POC) Negative Negative    Specific gravity (UA POC) 1.025 1.001 - 1.035    Blood (UA POC) Negative Negative    pH (UA POC) 6.0 4.6 - 8.0    Protein (UA POC) Negative Negative    Urobilinogen (UA POC) 0.2 mg/dL 0.2 - 1    Nitrites (UA POC) Negative Negative    Leukocyte esterase (UA POC) 2+ Negative         Assessment   32yo  @ 33w5d by 12.6wk sono      ICD-10-CM ICD-9-CM    1. 33 weeks gestation of pregnancy Z3A.33 V22.2 AMB POC URINALYSIS DIP STICK AUTO W/O MICRO      CBC W/O DIFF      CULTURE, URINE       Plan   · Continue routine prenatal care  · IUP: RH pos. Declined aneuploidy screen, Normal anatomy. S/p flu and tdap vaccine. GTT WNL.    · Third trimester CBC ordered today     Orders Placed This Encounter    CULTURE, URINE    CBC W/O DIFF    AMB POC URINALYSIS DIP STICK AUTO W/O MICRO     Labor precautions discussed, including: Regular painful contractions, lasting for greater than one hour, taking your breath away; any vaginal bleeding; any leakage of fluid; or absent or decreased fetal movement. Call M.D. on call if any of these symptoms or signs occur. I have discussed the diagnosis with the patient and the intended plan as seen in the above orders. Patient verbalizes understanding of the treatment plan and agrees with the plan. The patient has received an after-visit summary and questions were answered concerning future plans. I have discussed medication side effects and warnings with the patient as well. Informed pt to return to the office or go to the ER if she experiences vaginal bleeding, vaginal discharge, leaking of fluid, pelvic cramping.     Pt discussed with Dr. Bailey Damon (attending physician)    Shelley Cui MD  Family Medicine Resident

## 2019-06-06 NOTE — PROGRESS NOTES
Chief Complaint   Patient presents with    Routine Prenatal Visit     33w5d     1. Have you been to the ER, urgent care clinic since your last visit? Hospitalized since your last visit? No    2. Have you seen or consulted any other health care providers outside of the [address] [place] [address] since your last visit? Include any pap smears or colon screening.  No

## 2019-06-07 LAB
BACTERIA UR CULT: NORMAL
ERYTHROCYTE [DISTWIDTH] IN BLOOD BY AUTOMATED COUNT: 13.6 % (ref 12.3–15.4)
HCT VFR BLD AUTO: 37 % (ref 34–46.6)
HGB BLD-MCNC: 12.6 G/DL (ref 11.1–15.9)
MCH RBC QN AUTO: 31.7 PG (ref 26.6–33)
MCHC RBC AUTO-ENTMCNC: 34.1 G/DL (ref 31.5–35.7)
MCV RBC AUTO: 93 FL (ref 79–97)
PLATELET # BLD AUTO: 193 X10E3/UL (ref 150–450)
RBC # BLD AUTO: 3.98 X10E6/UL (ref 3.77–5.28)
WBC # BLD AUTO: 10.6 X10E3/UL (ref 3.4–10.8)

## 2019-06-21 ENCOUNTER — ROUTINE PRENATAL (OUTPATIENT)
Dept: FAMILY MEDICINE CLINIC | Age: 34
End: 2019-06-21

## 2019-06-21 VITALS — RESPIRATION RATE: 16 BRPM | HEIGHT: 57 IN | WEIGHT: 139 LBS | BODY MASS INDEX: 29.99 KG/M2

## 2019-06-21 DIAGNOSIS — Z3A.35 35 WEEKS GESTATION OF PREGNANCY: Primary | ICD-10-CM

## 2019-06-21 LAB
BILIRUB UR QL STRIP: NEGATIVE
GLUCOSE UR-MCNC: NEGATIVE MG/DL
KETONES P FAST UR STRIP-MCNC: NEGATIVE MG/DL
PH UR STRIP: 7 [PH] (ref 4.6–8)
PROT UR QL STRIP: NEGATIVE
SP GR UR STRIP: 1.02 (ref 1–1.03)
UA UROBILINOGEN AMB POC: NORMAL (ref 0.2–1)
URINALYSIS CLARITY POC: NORMAL
URINALYSIS COLOR POC: YELLOW
URINE BLOOD POC: NEGATIVE
URINE LEUKOCYTES POC: NORMAL
URINE NITRITES POC: NEGATIVE

## 2019-06-21 NOTE — PROGRESS NOTES
Return OB Visit   No complaints. Baby very active. Objective:   Resp 16   Ht 4' 9.4\" (1.458 m)   Wt 139 lb (63 kg)   LMP 10/04/2018 (Exact Date)   BMI 29.66 kg/m²   Estimated Date of Delivery: 19    See flowsheet   Assessment       ICD-10-CM ICD-9-CM    1. 35 weeks gestation of pregnancy Z3A.35 V22.2 AMB POC URINALYSIS DIP STICK AUTO W/O MICRO      CULTURE, GENITAL GROUP B STREP     Plan   34yo  @ 35w6d by 12.6wk sono  1. IUP: RH pos. Declined aneuploidy screen, Normal anatomy. S/p flu and tdap vaccine. GTT WNL. Third tri CBC WNL. GBS today.       Orders Placed This Encounter    CULTURE, GENITAL GROUP B STREP    AMB POC URINALYSIS DIP STICK AUTO W/O MICRO         Diya Santoro DO

## 2019-06-21 NOTE — PROGRESS NOTES
Chief Complaint   Patient presents with    Routine Prenatal Visit     35w6d    Leakage of Fluid: NO  Vaginal Bleeding: NO  Fetal Movement: YES  Prenatal vitamins: YES  Having Contractions: NO  Pain: NO    Visit Vitals  Resp 16   Ht 4' 9.4\" (1.458 m)   Wt 139 lb (63 kg)   LMP 10/04/2018 (Exact Date)   BMI 29.66 kg/m²

## 2019-06-25 LAB — B-HEM STREP SPEC QL CULT: NEGATIVE

## 2019-06-28 ENCOUNTER — ROUTINE PRENATAL (OUTPATIENT)
Dept: FAMILY MEDICINE CLINIC | Age: 34
End: 2019-06-28

## 2019-06-28 VITALS
RESPIRATION RATE: 16 BRPM | OXYGEN SATURATION: 98 % | HEART RATE: 70 BPM | SYSTOLIC BLOOD PRESSURE: 99 MMHG | DIASTOLIC BLOOD PRESSURE: 61 MMHG | BODY MASS INDEX: 30.85 KG/M2 | HEIGHT: 57 IN | WEIGHT: 143 LBS | TEMPERATURE: 98.6 F

## 2019-06-28 DIAGNOSIS — Z3A.36 36 WEEKS GESTATION OF PREGNANCY: Primary | ICD-10-CM

## 2019-06-28 LAB
BILIRUB UR QL STRIP: NORMAL
GLUCOSE UR-MCNC: NEGATIVE MG/DL
KETONES P FAST UR STRIP-MCNC: NORMAL MG/DL
PH UR STRIP: 6 [PH] (ref 4.6–8)
PROT UR QL STRIP: NORMAL
SP GR UR STRIP: 1.02 (ref 1–1.03)
UA UROBILINOGEN AMB POC: NORMAL (ref 0.2–1)
URINALYSIS CLARITY POC: NORMAL
URINALYSIS COLOR POC: NORMAL
URINE BLOOD POC: NORMAL
URINE LEUKOCYTES POC: NORMAL
URINE NITRITES POC: NEGATIVE

## 2019-06-28 NOTE — PROGRESS NOTES
Chief Complaint   Patient presents with    Routine Prenatal Visit     Leakage of Fluid: NO  Vaginal Bleeding: NO  Fetal Movement: YES  Prenatal vitamins: YES  Having Contractions: NO  Pain: NO  Nausea and stomach pain  vomtied 2 times yesterday  Visit Vitals  Resp 16   Ht 4' 9.4\" (1.458 m)   Wt 143 lb (64.9 kg)   LMP 10/04/2018 (Exact Date)   BMI 30.52 kg/m²

## 2019-06-28 NOTE — PROGRESS NOTES
Return OB Visit   No complaints. Baby very active. Objective:   BP 99/61 (BP 1 Location: Left arm, BP Patient Position: Sitting)   Pulse 70   Temp 98.6 °F (37 °C) (Oral)   Resp 16   Ht 4' 9.4\" (1.458 m)   Wt 143 lb (64.9 kg)   LMP 10/04/2018 (Exact Date)   SpO2 98%   BMI 30.52 kg/m²   Estimated Date of Delivery: 19    See flowsheet   Assessment       ICD-10-CM ICD-9-CM    1. 36 weeks gestation of pregnancy Z3A.36 V22.2 AMB POC URINALYSIS DIP STICK AUTO W/O MICRO     Plan   34yo  @ 36w6d by 12.6wk sono  1. IUP: RH pos. Declined aneuploidy screen, Normal anatomy. S/p flu and tdap vaccine. GTT WNL. Third tri CBC WNL. GBS today.     2.  S>D: will get growth scan - , pt short stature and at risk for shoulder dystocia     Orders Placed This Encounter    AMB POC URINALYSIS DIP STICK AUTO W/O MICRO         Diya Santoro,

## 2019-07-05 ENCOUNTER — ROUTINE PRENATAL (OUTPATIENT)
Dept: FAMILY MEDICINE CLINIC | Age: 34
End: 2019-07-05

## 2019-07-05 VITALS
TEMPERATURE: 98.3 F | RESPIRATION RATE: 18 BRPM | HEIGHT: 57 IN | SYSTOLIC BLOOD PRESSURE: 117 MMHG | OXYGEN SATURATION: 98 % | BODY MASS INDEX: 31.28 KG/M2 | WEIGHT: 145 LBS | DIASTOLIC BLOOD PRESSURE: 67 MMHG | HEART RATE: 67 BPM

## 2019-07-05 DIAGNOSIS — Z3A.37 37 WEEKS GESTATION OF PREGNANCY: Primary | ICD-10-CM

## 2019-07-05 LAB
BILIRUB UR QL STRIP: NEGATIVE
GLUCOSE UR-MCNC: NEGATIVE MG/DL
KETONES P FAST UR STRIP-MCNC: NEGATIVE MG/DL
PH UR STRIP: 6.5 [PH] (ref 4.6–8)
PROT UR QL STRIP: NEGATIVE
SP GR UR STRIP: 1.01 (ref 1–1.03)
UA UROBILINOGEN AMB POC: NORMAL (ref 0.2–1)
URINALYSIS CLARITY POC: CLEAR
URINALYSIS COLOR POC: YELLOW
URINE BLOOD POC: NEGATIVE
URINE LEUKOCYTES POC: NORMAL
URINE NITRITES POC: NEGATIVE

## 2019-07-05 NOTE — PROGRESS NOTES
Crow Geiger is a 28 y.o. female  Chief Complaint   Patient presents with    Routine Prenatal Visit     , denies bleeding, cramping or leakage of fluid, states is feeling the baby move     Visit Vitals  /67 (BP 1 Location: Right arm, BP Patient Position: Sitting)   Pulse 67   Temp 98.3 °F (36.8 °C) (Oral)   Resp 18   Ht 4' 9.4\" (1.458 m)   Wt 145 lb (65.8 kg)   LMP 10/04/2018 (Exact Date)   SpO2 98%   BMI 30.94 kg/m²     1. Have you been to the ER, urgent care clinic since your last visit? Hospitalized since your last visit? No    2. Have you seen or consulted any other health care providers outside of the [address] [place] [address] since your last visit? Include any pap smears or colon screening. No  There are no preventive care reminders to display for this patient.

## 2019-07-05 NOTE — PROGRESS NOTES
Return OB Visit   No complaints. Baby very active. Objective:   /67 (BP 1 Location: Right arm, BP Patient Position: Sitting)   Pulse 67   Temp 98.3 °F (36.8 °C) (Oral)   Resp 18   Ht 4' 9.4\" (1.458 m)   Wt 145 lb (65.8 kg)   LMP 10/04/2018 (Exact Date)   SpO2 98%   BMI 30.94 kg/m²   Estimated Date of Delivery: 19    See flowsheet   Assessment       ICD-10-CM ICD-9-CM    1. 37 weeks gestation of pregnancy Z3A.37 V22.2 AMB POC URINALYSIS DIP STICK AUTO W/O MICRO     Plan   34yo  @ 37w6d by 12.6wk sono  1. IUP: RH pos. Declined aneuploidy screen, Normal anatomy. S/p flu and tdap vaccine. GTT WNL. Third tri CBC WNL.   GBS neg.  2.  S>D: will get growth scan - , pt short stature and at risk for shoulder dystocia     Orders Placed This Encounter    AMB POC URINALYSIS DIP STICK AUTO W/O MICRO         Diya Santoro DO

## 2019-07-09 ENCOUNTER — HOSPITAL ENCOUNTER (OUTPATIENT)
Dept: PERINATAL CARE | Age: 34
Discharge: HOME | End: 2019-07-09
Attending: OBSTETRICS & GYNECOLOGY
Payer: SUBSIDIZED

## 2019-07-09 PROCEDURE — 76816 OB US FOLLOW-UP PER FETUS: CPT | Performed by: OBSTETRICS & GYNECOLOGY

## 2019-07-11 ENCOUNTER — ROUTINE PRENATAL (OUTPATIENT)
Dept: FAMILY MEDICINE CLINIC | Age: 34
End: 2019-07-11

## 2019-07-11 VITALS
BODY MASS INDEX: 31.5 KG/M2 | RESPIRATION RATE: 16 BRPM | HEIGHT: 57 IN | DIASTOLIC BLOOD PRESSURE: 61 MMHG | TEMPERATURE: 98.6 F | SYSTOLIC BLOOD PRESSURE: 104 MMHG | OXYGEN SATURATION: 98 % | HEART RATE: 73 BPM | WEIGHT: 146 LBS

## 2019-07-11 DIAGNOSIS — Z3A.38 38 WEEKS GESTATION OF PREGNANCY: Primary | ICD-10-CM

## 2019-07-11 LAB
BILIRUB UR QL STRIP: NORMAL
GLUCOSE UR-MCNC: NEGATIVE MG/DL
KETONES P FAST UR STRIP-MCNC: NORMAL MG/DL
PH UR STRIP: 5.5 [PH] (ref 4.6–8)
PROT UR QL STRIP: NORMAL
SP GR UR STRIP: 1.03 (ref 1–1.03)
UA UROBILINOGEN AMB POC: NORMAL (ref 0.2–1)
URINALYSIS CLARITY POC: NORMAL
URINALYSIS COLOR POC: YELLOW
URINE BLOOD POC: NEGATIVE
URINE LEUKOCYTES POC: NORMAL
URINE NITRITES POC: NEGATIVE

## 2019-07-11 NOTE — PROGRESS NOTES
Chief Complaint   Patient presents with    Routine Prenatal Visit     Leakage of Fluid: NO  Vaginal Bleeding: NO  Fetal Movement: YES  Prenatal vitamins: YES  Having Contractions: NO  Pain: NO    Visit Vitals  Ht 4' 9.4\" (1.458 m)   LMP 10/04/2018 (Exact Date)   BMI 30.94 kg/m²

## 2019-07-11 NOTE — PROGRESS NOTES
Return OB Visit   Orthostatic. Getting seconds of blurry vision when she changes positions only and a very brief headache. Says she's only drinking 2 bottles of water a day, used to drink much more. Objective:   /61 (BP 1 Location: Left arm, BP Patient Position: Sitting)   Pulse 73   Temp 98.6 °F (37 °C) (Oral)   Resp 16   Ht 4' 9.4\" (1.458 m)   Wt 146 lb (66.2 kg)   LMP 10/04/2018 (Exact Date)   SpO2 98%   BMI 31.16 kg/m²   Estimated Date of Delivery: 19    See flowsheet   Assessment       ICD-10-CM ICD-9-CM    1. 38 weeks gestation of pregnancy Z3A.38 V22.2 AMB POC URINALYSIS DIP STICK AUTO W/O MICRO     Plan   34yo  @ 38w5d by 12.6wk lea  1. IUP: RH pos. Declined aneuploidy screen, Normal anatomy. S/p flu and tdap vaccine. GTT WNL. Third tri CBC WNL. GBS neg.  2.  S>D: had growth scan  but awaiting report to be finished by provider   3.   Orthostasis: discussed drinking much more water and orthostatic precautions (getting up slowly, holding on to something when she stands, etc.)      Orders Placed This Encounter    AMB POC URINALYSIS DIP STICK AUTO W/O MICRO         Diya Santoro,

## 2019-07-13 PROCEDURE — 75810000275 HC EMERGENCY DEPT VISIT NO LEVEL OF CARE

## 2019-07-14 ENCOUNTER — ANESTHESIA (OUTPATIENT)
Dept: LABOR AND DELIVERY | Age: 34
DRG: 560 | End: 2019-07-14
Payer: MEDICAID

## 2019-07-14 ENCOUNTER — ANESTHESIA EVENT (OUTPATIENT)
Dept: LABOR AND DELIVERY | Age: 34
DRG: 560 | End: 2019-07-14
Payer: MEDICAID

## 2019-07-14 ENCOUNTER — HOSPITAL ENCOUNTER (INPATIENT)
Age: 34
LOS: 2 days | Discharge: HOME | DRG: 560 | End: 2019-07-16
Attending: OBSTETRICS & GYNECOLOGY | Admitting: OBSTETRICS & GYNECOLOGY
Payer: MEDICAID

## 2019-07-14 PROBLEM — Z34.90 PREGNANCY: Status: ACTIVE | Noted: 2019-07-14

## 2019-07-14 LAB
BASOPHILS # BLD: 0 K/UL (ref 0–0.1)
BASOPHILS NFR BLD: 0 % (ref 0–1)
DAILY QC (YES/NO)?: YES
DIFFERENTIAL METHOD BLD: ABNORMAL
EOSINOPHIL # BLD: 0.1 K/UL (ref 0–0.4)
EOSINOPHIL NFR BLD: 1 % (ref 0–7)
ERYTHROCYTE [DISTWIDTH] IN BLOOD BY AUTOMATED COUNT: 12.8 % (ref 11.5–14.5)
HCT VFR BLD AUTO: 36.9 % (ref 35–47)
HGB BLD-MCNC: 12.4 G/DL (ref 11.5–16)
IMM GRANULOCYTES # BLD AUTO: 0.1 K/UL (ref 0–0.04)
IMM GRANULOCYTES NFR BLD AUTO: 1 % (ref 0–0.5)
LYMPHOCYTES # BLD: 3 K/UL (ref 0.8–3.5)
LYMPHOCYTES NFR BLD: 29 % (ref 12–49)
MCH RBC QN AUTO: 31.6 PG (ref 26–34)
MCHC RBC AUTO-ENTMCNC: 33.6 G/DL (ref 30–36.5)
MCV RBC AUTO: 93.9 FL (ref 80–99)
MONOCYTES # BLD: 0.8 K/UL (ref 0–1)
MONOCYTES NFR BLD: 8 % (ref 5–13)
NEUTS SEG # BLD: 6.3 K/UL (ref 1.8–8)
NEUTS SEG NFR BLD: 61 % (ref 32–75)
NRBC # BLD: 0 K/UL (ref 0–0.01)
NRBC BLD-RTO: 0 PER 100 WBC
PH, VAGINAL FLUID: 6.5 (ref 5–6.1)
PLATELET # BLD AUTO: 181 K/UL (ref 150–400)
PMV BLD AUTO: 12.3 FL (ref 8.9–12.9)
RBC # BLD AUTO: 3.93 M/UL (ref 3.8–5.2)
WBC # BLD AUTO: 10.3 K/UL (ref 3.6–11)

## 2019-07-14 PROCEDURE — 77030011943

## 2019-07-14 PROCEDURE — 75410000000 HC DELIVERY VAGINAL/SINGLE: Performed by: OBSTETRICS & GYNECOLOGY

## 2019-07-14 PROCEDURE — 74011250636 HC RX REV CODE- 250/636: Performed by: OBSTETRICS & GYNECOLOGY

## 2019-07-14 PROCEDURE — 65270000029 HC RM PRIVATE

## 2019-07-14 PROCEDURE — 36415 COLL VENOUS BLD VENIPUNCTURE: CPT

## 2019-07-14 PROCEDURE — 74011000250 HC RX REV CODE- 250: Performed by: ANESTHESIOLOGY

## 2019-07-14 PROCEDURE — 77030014125 HC TY EPDRL BBMI -B: Performed by: ANESTHESIOLOGY

## 2019-07-14 PROCEDURE — 83986 ASSAY PH BODY FLUID NOS: CPT | Performed by: OBSTETRICS & GYNECOLOGY

## 2019-07-14 PROCEDURE — 76060000078 HC EPIDURAL ANESTHESIA: Performed by: ANESTHESIOLOGY

## 2019-07-14 PROCEDURE — 0KQM0ZZ REPAIR PERINEUM MUSCLE, OPEN APPROACH: ICD-10-PCS | Performed by: OBSTETRICS & GYNECOLOGY

## 2019-07-14 PROCEDURE — 74011250637 HC RX REV CODE- 250/637: Performed by: STUDENT IN AN ORGANIZED HEALTH CARE EDUCATION/TRAINING PROGRAM

## 2019-07-14 PROCEDURE — 74011000250 HC RX REV CODE- 250

## 2019-07-14 PROCEDURE — 3E033VJ INTRODUCTION OF OTHER HORMONE INTO PERIPHERAL VEIN, PERCUTANEOUS APPROACH: ICD-10-PCS | Performed by: OBSTETRICS & GYNECOLOGY

## 2019-07-14 PROCEDURE — 77010026065 HC OXYGEN MINIMUM MEDICAL AIR: Performed by: OBSTETRICS & GYNECOLOGY

## 2019-07-14 PROCEDURE — 74011000258 HC RX REV CODE- 258: Performed by: OBSTETRICS & GYNECOLOGY

## 2019-07-14 PROCEDURE — 75410000002 HC LABOR FEE PER 1 HR: Performed by: OBSTETRICS & GYNECOLOGY

## 2019-07-14 PROCEDURE — 85025 COMPLETE CBC W/AUTO DIFF WBC: CPT

## 2019-07-14 PROCEDURE — 75410000003 HC RECOV DEL/VAG/CSECN EA 0.5 HR: Performed by: OBSTETRICS & GYNECOLOGY

## 2019-07-14 RX ORDER — SODIUM CHLORIDE, SODIUM LACTATE, POTASSIUM CHLORIDE, CALCIUM CHLORIDE 600; 310; 30; 20 MG/100ML; MG/100ML; MG/100ML; MG/100ML
125 INJECTION, SOLUTION INTRAVENOUS CONTINUOUS
Status: DISCONTINUED | OUTPATIENT
Start: 2019-07-14 | End: 2019-07-16 | Stop reason: HOSPADM

## 2019-07-14 RX ORDER — ZOLPIDEM TARTRATE 5 MG/1
5 TABLET ORAL
Status: DISCONTINUED | OUTPATIENT
Start: 2019-07-14 | End: 2019-07-16 | Stop reason: HOSPADM

## 2019-07-14 RX ORDER — NALBUPHINE HYDROCHLORIDE 10 MG/ML
2.5 INJECTION, SOLUTION INTRAMUSCULAR; INTRAVENOUS; SUBCUTANEOUS
Status: DISCONTINUED | OUTPATIENT
Start: 2019-07-14 | End: 2019-07-14 | Stop reason: HOSPADM

## 2019-07-14 RX ORDER — NALOXONE HYDROCHLORIDE 0.4 MG/ML
0.4 INJECTION, SOLUTION INTRAMUSCULAR; INTRAVENOUS; SUBCUTANEOUS AS NEEDED
Status: DISCONTINUED | OUTPATIENT
Start: 2019-07-14 | End: 2019-07-14 | Stop reason: HOSPADM

## 2019-07-14 RX ORDER — EPHEDRINE SULFATE/0.9% NACL/PF 50 MG/5 ML
10 SYRINGE (ML) INTRAVENOUS
Status: DISCONTINUED | OUTPATIENT
Start: 2019-07-14 | End: 2019-07-14 | Stop reason: HOSPADM

## 2019-07-14 RX ORDER — OXYTOCIN/RINGER'S LACTATE 20/1000 ML
125-500 PLASTIC BAG, INJECTION (ML) INTRAVENOUS ONCE
Status: ACTIVE | OUTPATIENT
Start: 2019-07-14 | End: 2019-07-15

## 2019-07-14 RX ORDER — BUPIVACAINE HYDROCHLORIDE 2.5 MG/ML
INJECTION, SOLUTION EPIDURAL; INFILTRATION; INTRACAUDAL AS NEEDED
Status: DISCONTINUED | OUTPATIENT
Start: 2019-07-14 | End: 2019-07-14 | Stop reason: HOSPADM

## 2019-07-14 RX ORDER — OXYTOCIN/0.9 % SODIUM CHLORIDE 30/500 ML
0-25 PLASTIC BAG, INJECTION (ML) INTRAVENOUS
Status: DISCONTINUED | OUTPATIENT
Start: 2019-07-14 | End: 2019-07-16 | Stop reason: HOSPADM

## 2019-07-14 RX ORDER — NALBUPHINE HYDROCHLORIDE 10 MG/ML
10 INJECTION, SOLUTION INTRAMUSCULAR; INTRAVENOUS; SUBCUTANEOUS
Status: DISCONTINUED | OUTPATIENT
Start: 2019-07-14 | End: 2019-07-16 | Stop reason: HOSPADM

## 2019-07-14 RX ORDER — HYDROCORTISONE ACETATE PRAMOXINE HCL 2.5; 1 G/100G; G/100G
CREAM TOPICAL AS NEEDED
Status: DISCONTINUED | OUTPATIENT
Start: 2019-07-14 | End: 2019-07-16 | Stop reason: HOSPADM

## 2019-07-14 RX ORDER — LIDOCAINE HYDROCHLORIDE AND EPINEPHRINE 20; 5 MG/ML; UG/ML
INJECTION, SOLUTION EPIDURAL; INFILTRATION; INTRACAUDAL; PERINEURAL AS NEEDED
Status: DISCONTINUED | OUTPATIENT
Start: 2019-07-14 | End: 2019-07-14 | Stop reason: HOSPADM

## 2019-07-14 RX ORDER — IBUPROFEN 800 MG/1
800 TABLET ORAL EVERY 8 HOURS
Status: DISCONTINUED | OUTPATIENT
Start: 2019-07-14 | End: 2019-07-16 | Stop reason: HOSPADM

## 2019-07-14 RX ORDER — FENTANYL/BUPIVACAINE/NS/PF 2-1250MCG
1-16 PREFILLED PUMP RESERVOIR EPIDURAL CONTINUOUS
Status: DISCONTINUED | OUTPATIENT
Start: 2019-07-14 | End: 2019-07-16 | Stop reason: HOSPADM

## 2019-07-14 RX ORDER — HYDROCODONE BITARTRATE AND ACETAMINOPHEN 5; 325 MG/1; MG/1
1 TABLET ORAL
Status: DISCONTINUED | OUTPATIENT
Start: 2019-07-14 | End: 2019-07-16 | Stop reason: HOSPADM

## 2019-07-14 RX ORDER — NALBUPHINE HYDROCHLORIDE 10 MG/ML
2.5 INJECTION, SOLUTION INTRAMUSCULAR; INTRAVENOUS; SUBCUTANEOUS
Status: ACTIVE | OUTPATIENT
Start: 2019-07-14 | End: 2019-07-14

## 2019-07-14 RX ORDER — OXYTOCIN/0.9 % SODIUM CHLORIDE 30/500 ML
0-25 PLASTIC BAG, INJECTION (ML) INTRAVENOUS
Status: DISCONTINUED | OUTPATIENT
Start: 2019-07-14 | End: 2019-07-14

## 2019-07-14 RX ORDER — NALOXONE HYDROCHLORIDE 0.4 MG/ML
0.4 INJECTION, SOLUTION INTRAMUSCULAR; INTRAVENOUS; SUBCUTANEOUS AS NEEDED
Status: DISCONTINUED | OUTPATIENT
Start: 2019-07-14 | End: 2019-07-16 | Stop reason: HOSPADM

## 2019-07-14 RX ADMIN — Medication 14 ML/HR: at 08:49

## 2019-07-14 RX ADMIN — SODIUM CHLORIDE, SODIUM LACTATE, POTASSIUM CHLORIDE, AND CALCIUM CHLORIDE 125 ML/HR: 600; 310; 30; 20 INJECTION, SOLUTION INTRAVENOUS at 02:00

## 2019-07-14 RX ADMIN — SODIUM CHLORIDE, SODIUM LACTATE, POTASSIUM CHLORIDE, AND CALCIUM CHLORIDE 999 ML/HR: 600; 310; 30; 20 INJECTION, SOLUTION INTRAVENOUS at 10:49

## 2019-07-14 RX ADMIN — SODIUM CHLORIDE, SODIUM LACTATE, POTASSIUM CHLORIDE, AND CALCIUM CHLORIDE 500 ML: 600; 310; 30; 20 INJECTION, SOLUTION INTRAVENOUS at 01:07

## 2019-07-14 RX ADMIN — OXYTOCIN-SODIUM CHLORIDE 0.9% IV SOLN 30 UNIT/500ML 4 MILLI-UNITS/MIN: 30-0.9/5 SOLUTION at 03:15

## 2019-07-14 RX ADMIN — PROMETHAZINE HYDROCHLORIDE 12.5 MG: 25 INJECTION INTRAMUSCULAR; INTRAVENOUS at 05:50

## 2019-07-14 RX ADMIN — SODIUM CHLORIDE, SODIUM LACTATE, POTASSIUM CHLORIDE, AND CALCIUM CHLORIDE 999 ML/HR: 600; 310; 30; 20 INJECTION, SOLUTION INTRAVENOUS at 08:03

## 2019-07-14 RX ADMIN — BUPIVACAINE HYDROCHLORIDE 3 ML: 2.5 INJECTION, SOLUTION EPIDURAL; INFILTRATION; INTRACAUDAL at 08:35

## 2019-07-14 RX ADMIN — IBUPROFEN 800 MG: 800 TABLET ORAL at 18:25

## 2019-07-14 RX ADMIN — NALBUPHINE HYDROCHLORIDE 10 MG: 10 INJECTION, SOLUTION INTRAMUSCULAR; INTRAVENOUS; SUBCUTANEOUS at 05:41

## 2019-07-14 RX ADMIN — OXYTOCIN-SODIUM CHLORIDE 0.9% IV SOLN 30 UNIT/500ML 2 MILLI-UNITS/MIN: 30-0.9/5 SOLUTION at 02:29

## 2019-07-14 RX ADMIN — LIDOCAINE HYDROCHLORIDE AND EPINEPHRINE 3 ML: 20; 5 INJECTION, SOLUTION EPIDURAL; INFILTRATION; INTRACAUDAL; PERINEURAL at 08:35

## 2019-07-14 NOTE — PROGRESS NOTES
Pads weighed upon admission to MIU. Blood loss of 110ml. Uterus firm at U with small to moderate bleeding. Trickle with fundal check.

## 2019-07-14 NOTE — ANESTHESIA PROCEDURE NOTES
Epidural Block    Start time: 7/14/2019 8:26 AM  End time: 7/14/2019 8:41 AM  Performed by: Lesvia Sanchez MD  Authorized by:  Lesvia Sanchez MD     Pre-Procedure  Indication: at surgeon's request and labor epidural    Preanesthetic Checklist: patient identified, risks and benefits discussed, anesthesia consent, patient being monitored, timeout performed and anesthesia consent      Epidural:   Patient position:  Seated  Prep region:  Lumbar  Prep: Betadine    Location:  L3-4    Needle and Epidural Catheter:   Needle Type:  Tuohy  Needle Gauge:  17 G  Injection Technique:  Loss of resistance using air  Attempts:  1  Catheter Size:  20 G  Events: no blood with aspiration, no cerebrospinal fluid with aspiration, no paresthesia and negative aspiration test    Test Dose:  Negative    Assessment:   Catheter Secured:  Tegaderm and tape  Insertion:  Uncomplicated  Patient tolerance:  Patient tolerated the procedure well with no immediate complications

## 2019-07-14 NOTE — H&P
History & Physical    Name: Margy Rich MRN: [id number]  SSN: [id number]    YOB: 1985  Age: 28 y.o. Sex: female      Subjective:     Reason for Admission:  Pregnancy, Contractions and SROM. History of Present Illness: Ms. Zhane Jeronimo is a 28 y.o.   female with an estimated gestational age of 36w3d with NIXON: 19. Patient complains of moderate contractions and moderate vaginal leaking of fluid. SROM(clear fluid) occurred ~ 10pm. Experiencing Ctx q5min. Pregnancy has been complicated by S>D per chart review, but 3rd trimester U/S indicates normal size for date. Patient denies chest pain, fever, headache , nausea and vomiting, pelvic pressure, shortness of breath, vaginal bleeding , visual disturbances and dysuria. +FM, on PNV. OB History    Para Term  AB Living   2 1 1     1   SAB TAB Ectopic Molar Multiple Live Births             1      # Outcome Date GA Lbr Hilton/2nd Weight Sex Delivery Anes PTL Lv   2 Current            1 Term 13 40w0d  7 lb 15 oz (3.6 kg) F Vag-Spont None N KATHERYN     No past medical history on file. No past surgical history on file. Social History     Occupational History    Not on file   Tobacco Use    Smoking status: Never Smoker    Smokeless tobacco: Never Used   Substance and Sexual Activity    Alcohol use: No     Frequency: Never    Drug use: No    Sexual activity: Yes     Partners: Male      No family history on file. No Known Allergies  Prior to Admission medications    Medication Sig Start Date End Date Taking? Authorizing Provider   PNV No12-Iron-FA-DSS-OM-3 29 mg iron-1 mg -50 mg CPKD Take  by mouth. Yes Provider, Historical        Review of Systems:  A comprehensive review of systems was negative except for that written in the History of Present Illness. Objective:     Vitals:    Vitals:    19 0018   Weight: 134 lb (60.8 kg)   Height: 4' 9\" (1.448 m)      No data recorded.     No data recorded     Physical Exam:  Patient without distress. Breast: normal breast exam  Heart: Regular rate and rhythm or S1S2 present  Lung: clear to auscultation throughout lung fields, no wheezes, no rales, no rhonchi and normal respiratory effort  Back: costovertebral angle tenderness absent  Abdomen: soft, nontender  Fundus: soft and non tender  Perineum: blood absent, amniotic fluid present  Cervical Exam: 1.5/50/posterior  Lower Extremities:  - Edema No     Membranes:  Spontaneous Rupture of Membranes; Amniotic Fluid: clear fluid  Uterine Activity:  Frequency: Every 3-4 minutes   Fetal Heart Rate:  Reactive  Baseline: 130 per minute  Variability: moderate  Accelerations: yes  Decelerations: none  Uterine contractions: regular, every 3-4 minutes       Lab/Data Review:  Recent Results (from the past 24 hour(s))   PH BY NITRAZINE, POC    Collection Time: 19 12:21 AM   Result Value Ref Range    pH-Nitrazine paper 6.5 (A) 5.0 - 6.1    Daily QC performed? Yes    CBC WITH AUTOMATED DIFF    Collection Time: 19  1:09 AM   Result Value Ref Range    WBC 10.3 3.6 - 11.0 K/uL    RBC 3.93 3.80 - 5.20 M/uL    HGB 12.4 11.5 - 16.0 g/dL    HCT 36.9 35.0 - 47.0 %    MCV 93.9 80.0 - 99.0 FL    MCH 31.6 26.0 - 34.0 PG    MCHC 33.6 30.0 - 36.5 g/dL    RDW 12.8 11.5 - 14.5 %    PLATELET 646 368 - 630 K/uL    MPV 12.3 8.9 - 12.9 FL    NRBC 0.0 0  WBC    ABSOLUTE NRBC 0.00 0.00 - 0.01 K/uL    NEUTROPHILS 61 32 - 75 %    LYMPHOCYTES 29 12 - 49 %    MONOCYTES 8 5 - 13 %    EOSINOPHILS 1 0 - 7 %    BASOPHILS 0 0 - 1 %    IMMATURE GRANULOCYTES 1 (H) 0.0 - 0.5 %    ABS. NEUTROPHILS 6.3 1.8 - 8.0 K/UL    ABS. LYMPHOCYTES 3.0 0.8 - 3.5 K/UL    ABS. MONOCYTES 0.8 0.0 - 1.0 K/UL    ABS. EOSINOPHILS 0.1 0.0 - 0.4 K/UL    ABS. BASOPHILS 0.0 0.0 - 0.1 K/UL    ABS. IMM.  GRANS. 0.1 (H) 0.00 - 0.04 K/UL    DF AUTOMATED         Assessment and Plan:     Ms. Herminia Conteh is a 28 y.o.   female with an estimated gestational age of 36w3d who is admited for SROM and contractions. SIUP at 39w1d  PNL: B+, Ab screen neg, rubella/VZV immune, HIV/hepB/RPR neg, CG/chlamydia neg, GBS neg. Hg 12.4    1. NST: Cat 1 tracing. Nitrazine +. Confirmed vertex with bedside U/S  2. CVE: 1.5/50/posterior. Will initiate Pitocin  3. Pain management: Declines epidural, desires IV meds. Will likely initiate nubain and/or NO  4. 3rd trim U/S with consistent size for date. 5. Continue antepartum care  6. Anticipating baby boy. Declines Circ. Pediatrician unknown. Mother plans to bottle feed. Patient was seen with Dr. Aston Morales.     Antoine Estrada MD  Family Medicine Resident

## 2019-07-14 NOTE — PROGRESS NOTES
Residents at the bedside    0050 Consents reviewed via blue phone with   Pt requesting pain medication, but refuses offer of epidural. Order received from Dr Que Pederson for 10mg nubain ivp and phenergan 12.5mg ivpb    0601  Pt encouraged to change positions for comfort, pt assisted to hands and knees with hob elevated for pt to lean into. Bedside and Verbal shift change report given to TONI Dickson RN by Vishal Gutierrez. Report included the following information SBAR, MAR and Recent Results.

## 2019-07-14 NOTE — ANESTHESIA PREPROCEDURE EVALUATION
Relevant Problems   No relevant active problems       Anesthetic History   No history of anesthetic complications            Review of Systems / Medical History  Patient summary reviewed, nursing notes reviewed and pertinent labs reviewed    Pulmonary  Within defined limits                 Neuro/Psych   Within defined limits           Cardiovascular                  Exercise tolerance: >4 METS     GI/Hepatic/Renal  Within defined limits              Endo/Other  Within defined limits           Other Findings              Physical Exam    Airway  Mallampati: II  TM Distance: 4 - 6 cm  Neck ROM: normal range of motion   Mouth opening: Normal     Cardiovascular    Rhythm: regular  Rate: normal         Dental  No notable dental hx       Pulmonary                 Abdominal  GI exam deferred       Other Findings            Anesthetic Plan    ASA: 2  Anesthesia type: epidural            Anesthetic plan and risks discussed with: Patient and Spouse

## 2019-07-14 NOTE — ROUTINE PROCESS
Bedside and Verbal shift change report given to Carlo Walker RN (oncoming nurse) by Ronnell Carter RN (offgoing nurse). Report included the following information SBAR, Kardex and MAR.

## 2019-07-14 NOTE — PROGRESS NOTES
5694:  Dr Loulou Vila notified pt requesting eipdural, Bolus initiated; Orders requested.   Dr Diann Banegas will be on for the day, per Dr Loulou Vila

## 2019-07-14 NOTE — PROGRESS NOTES
7:20 AM  Bedside and Verbal shift change report given to Shira Kim RN (oncoming nurse) by Sammi Ann RN (offgoing nurse). Report included the following information SBAR, Kardex, Procedure Summary, Intake/Output, MAR, Recent Results, Med Rec Status, Pre Procedure Checklist and Procedure Verification. Assumed care of the pt.     07:25AM  Patient in hands and knees position during cx's. Difficulty in obtaining continuous  FH tracing; handhold for FH tracing. Will continue to monitor pt.     07:30AM  Patient using nitrous oxide as she requested. Patient tolerating well; pt denies any pain relief. Will continue to monitor pt.     07:50AM  Patient now requesting an epidural. IV bolus started and Anes paged for epidural orders. Will continue to monitor pt. 08:45AM  Epidural placed by Dr. Hanna Marie (Anes); pt tolerated procedure well and is reporting pain relief. Pericare provided. Will continue to monitor pt.     09:00AM  Patient asleep and is resting comfortably. Will continue to monitor pt.    10:15AM  Patient straight cath, 100ml clear yellow urine emptied. Will continue to monitor pt.    10:18AM  SVE performed and pt is 10/100/+2. MD called and made aware at 10:19AM. Patient to begin pushing momentarily. Delivery table prepared in room. 10:20AM  MD in room to assess pt as she begins pushing and to perform SVE for fetal presentation. 10:26AM  Patient begins pushing as ok'd by the MD.    11:07AM   of viable baby boy by Dr. Sanchez and  (resident), robust cry heard on delivery. Delayed cord clamping for 1min and fob cut the cord. Baby immediately placed skin to skin on the pt. Apgars 9/9. Patient had a 2nd degree perineal laceration and was repaired with a 4-0 chromic, 0-vicryl, and 2-0 vicryl. 13:15PM  Patient given straight cath as fundus noted to the U+1 and deviated to the right,lochia moderate. 200ml clear yellow urine emptied from the straight cath. Chucks weighed.  EBL in delivery 300ml. QBL for 2hr recovery is 117ml. Total QBL 417ml. 14:30 PM  TRANSFER - OUT REPORT:    Verbal report given to Janneth Mora RN(name) on Angelica Yepez  being transferred to MIU(unit) for routine progression of care       Report consisted of patients Situation, Background, Assessment and   Recommendations(SBAR). Information from the following report(s) SBAR, Kardex, Procedure Summary, Intake/Output, MAR, Recent Results and Med Rec Status was reviewed with the receiving nurse. Lines:   Peripheral IV 19 Left;Posterior Hand (Active)   Site Assessment Clean, dry, & intact 2019  2:30 PM   Phlebitis Assessment 0 2019  2:30 PM   Infiltration Assessment 0 2019  2:30 PM   Dressing Status Clean, dry, & intact 2019  2:30 PM   Dressing Type Tape;Transparent 2019  2:30 PM   Hub Color/Line Status Pink;Capped 2019  2:30 PM   Action Taken Open ports on tubing capped 2019  7:30 AM   Alcohol Cap Used Yes 2019  7:30 AM        Opportunity for questions and clarification was provided.  ID bands confirmed with Janneth Mora RN.     Patient transported with all via open crib and with all belongings:   Registered Nurse  Tech

## 2019-07-14 NOTE — L&D DELIVERY NOTE
Delivery Summary    Patient: Carlos Gee MRN: [id number]  SSN: [id number]    YOB: 1985  Age: 28 y.o. Sex: female       Information for the patient's :  Malik Whitten [[id number]]       Labor Events:    Labor: No    Steroids: None   Cervical Ripening Date/Time:       Cervical Ripening Type: None   Antibiotics During Labor: No   Rupture Identifier:      Rupture Date/Time: 2019 8:30 PM   Rupture Type: SROM   Amniotic Fluid Volume: Moderate    Amniotic Fluid Description: Clear    Amniotic Fluid Odor: None    Induction: None       Induction Date/Time:        Indications for Induction:      Augmentation: Oxytocin   Augmentation Date/Time: 2019    Indications for Augmentation: Ineffective Contraction Pattern   Labor complications: None       Additional complications:        Delivery Events:  Indications For Episiotomy:     Episiotomy:     Perineal Laceration(s): 2nd   Repaired: Yes   Periurethral Laceration Location:      Repaired:     Labial Laceration Location:     Repaired:     Sulcal Laceration Location:     Repaired:     Vaginal Laceration Location:     Repaired:     Cervical Laceration Location:     Repaired:     Repair Suture: Vicryl 2-0;Chromic 4-0   Number of Repair Packets: 3   Estimated Blood Loss (ml):  ml     Delivery Date: 2019    Delivery Time: 11:07 AM  Delivery Type: Vaginal, Spontaneous  Sex:  Male    Gestational Age: 36w3d   Delivery Clinician:  Jefry Dupont  Living Status: Living   Delivery Location: L&D [facility]          APGARS  One minute Five minutes Ten minutes   Skin color:   1   1      Heart rate:   2   2      Grimace:   2   2      Muscle tone:   2   2      Breathin   2      Totals:   9   9          Presentation: Vertex    Position: Right Occiput Anterior  Resuscitation Method:  Suctioning-bulb; Tactile Stimulation     Meconium Stained: None      Cord Information: 3 Vessels  Complications: Nuchal Cord With Compressions  Cord around: head  Delayed cord clamping? Yes  Cord clamped date/time:2019 11:08 AM  Disposition of Cord Blood: Discard    Blood Gases Sent?: No    Placenta:  Date/Time: 2019 11:11 AM  Removal: Spontaneous      Appearance: Normal;Intact      Measurements:  Birth Weight:      3125 gm  Birth Length:        Head Circumference:        Chest Circumference:       Abdominal Girth: Other Providers:   MISHA NIELSEN;KATELYN RODGERS;HARRISON BRODERICK;;VIKASH PATEL;CODY PATEL, Obstetrician;Primary Nurse;Primary Vilonia Nurse; Anesthesiologist;Charge Nurse;Tech     Group B Strep: Negative    Delivery Narrative:  Patient progressed well to C/C/+2 and pushed for approximately 45 min with  over 2nd degree laceration of liveborn male infant, apgars 9/9, weight 3125 gm. Head delivered slightly WENDIE. Loose nuchal cord x 1 manually reduced. Anterior (left) shoulder delivered with single maternal effort with posterior shoulder and body following easily. Infant placed on maternal abdomen. Delayed cord clamping x 1 min. Cord clamped x 2 and cut by FOB. Pitocin infusion initiated. Placenta delivered spontaneously intact with 3v cord. Perineum inspected. Rectal capsule visible but found to be intact. Capsule reinforced with O vicryl interrupted stitches x 2.  2nd degree then repaired using 3-0 vicryl in standard fashion. Small oozing area of the vaginal mucosa at the site of the second degree required a figure of eight stitch for complete hemostasis (4-0 chromic). No cervical lacerations seen. Fundus firm at U. Mother and baby bonding in LDR. Two hour total post delivery QBL pending.

## 2019-07-14 NOTE — PROGRESS NOTES
Labor Progress Note  Patient seen, fetal heart rate and contraction pattern evaluated, patient examined. Patient requested Epidural, due to painful contractions. Prior to epidural patient non-compliant with directions, was on hands and knees, unable to obtain consistent FHR during this time, Pit stopped. Patient had some bloody show, limited. Cervical Exam performed, results below. Patient asked for Epidural, order was then placed and patient was bolused with IV fluids and Epidural given. Pain well controlled after Epidural. FHR able to be assessed adequately post Epi, results below. No data found. Physical Exam:  Cervical Exam:  Cervical Exam  Dilation (cm): 8  Eff: 70 %  Station: 0  Position: Cephalic  Vaginal exam done by: FAIZAN Kellogg RN and Dr. Becka Bustamante Status: SROM  Membranes:  Spontaneous Rupture of Membranes; Amniotic Fluid: clear fluid  Uterine Activity: Frequency: Every 2-3 minutes and Duration: 30 seconds  Fetal Heart Rate: Reactive  Baseline: 120 per minute  Variability: moderate  Decelerations: 1 late decel, to 90 bpm, dur 45 secs    Assessment/Plan:  Angelica Rg is a 28 y.o.  at 39w1d admitted for Labor and SROM. Patient was in a lot of pain associated with her contractions and requested an Epidural.  Pit stopped due to patient being on hands and knees due to pain of contractions, was at 6U. Anesthesia consulted. Pit was restarted at 3U, then stopped due to 1 late decel: 90 bpm, dur. 45 secs. 1. Epidural in place  2. Pit restarted at [facility]. Then stopped. 3. Will continue to monitor FHR  4. Anticipate uncomplicated   Patient examined and discuss with Dr. Justin Soliman.   Qiana Cabrera MD  Family Medicine Resident

## 2019-07-15 PROCEDURE — 65270000029 HC RM PRIVATE

## 2019-07-15 PROCEDURE — 74011250637 HC RX REV CODE- 250/637: Performed by: STUDENT IN AN ORGANIZED HEALTH CARE EDUCATION/TRAINING PROGRAM

## 2019-07-15 RX ADMIN — IBUPROFEN 800 MG: 800 TABLET ORAL at 19:54

## 2019-07-15 RX ADMIN — IBUPROFEN 800 MG: 800 TABLET ORAL at 04:04

## 2019-07-15 RX ADMIN — IBUPROFEN 800 MG: 800 TABLET ORAL at 11:52

## 2019-07-15 NOTE — LACTATION NOTE
This note was copied from a baby's chart. 0993 - Pt eating breakfast, infant at bedside. Pt request RN to return after breakfast.   4565 - Rounding to determine pt needs. Pt states she is both breast and bottlefeeding. She states she does not need the assistance of lactation as she will be bottlefeeding in hospital until \"milk comes in\". Will defer consults.

## 2019-07-15 NOTE — ROUTINE PROCESS
Bedside and Verbal shift change report given to Óscar Dee RN (oncoming nurse) by Wu Joy RN (offgoing nurse). Report included the following information SBAR, Intake/Output and MAR.

## 2019-07-15 NOTE — PROGRESS NOTES
Cross Cultural Services    Provided interpretation for JOSE G Pepe during pain assessment. Patient had the opportunity to ask questions and all questions were answered. JOSE G Pepe will place  phone in room for complete communication with patient.                         [facility] Certified    can be requested at: [email or web address]

## 2019-07-15 NOTE — PROGRESS NOTES
Post-Partum Day Number 1 Progress Note    Mrs. Harvel Galeazzi is a 27 y/o  speaking female [id number] s/p uncomplicated  at 67L5R. 2nd degree perineal laceration (repaired) adequately treated with pad and ice post-delivery. Her post-partum course has been uncomplicated as of this AM. Total , about 300 cc during delivery and 110 cc during the 2 hr post. Patient is doing well without significant complaint. Pain well controlled on Ibuprofen 800 mg Q8H. Lochia described as less than a normal menstration. She is tolerating a regular diet, ambulating, voiding without difficulty, and passing flatus. No BM as of yet but does not report feeling constipated. Patient is breast feeding without difficulties and does not want circumcision for baby boy. She denies CP, SOB, n/v, LE pain, changes in vision, and headache. Vitals:  No data found. Temp (24hrs), Av.8 °F (37.1 °C), Min:98.6 °F (37 °C), Max:99.2 °F (37.3 °C)      Exam:  Patient without distress. CTAB, no w/r/r/c.               RRR, +S1 and S2, no m/r/g. Abdomen soft, fundus firm at level of umbilicus, nontender. Perineum with normal lochia noted. Lower extremities:  No edema. No palpable cords or tenderness. Lab/Data Review:  No results found for this or any previous visit (from the past 12 hour(s)). Assessment and Plan:    Mrs. Harvel Galeazzi is a 27 y/o  speaking female [id number] s/p uncomplicated  at 69G0L. Patient appears to be having uncomplicated post-partum course. 1. Continue routine perineal care and maternal education. 2. Continue pain management with Motrin 800 mg Q8H. 3. Plan discharge tomorrow if no problems occur.     Patient examined and discussed with Dr. Aurora Burger MD  Family Medicine Resident

## 2019-07-15 NOTE — PROGRESS NOTES
Bedside shift change report given to [address] [place] [address] (oncoming nurse) by Santos Villatoro (offgoing nurse). Report included the following information SBAR and MAR.

## 2019-07-16 VITALS
SYSTOLIC BLOOD PRESSURE: 125 MMHG | TEMPERATURE: 98.3 F | DIASTOLIC BLOOD PRESSURE: 72 MMHG | WEIGHT: 134 LBS | HEART RATE: 62 BPM | RESPIRATION RATE: 16 BRPM | BODY MASS INDEX: 28.91 KG/M2 | OXYGEN SATURATION: 99 % | HEIGHT: 57 IN

## 2019-07-16 PROCEDURE — 74011250637 HC RX REV CODE- 250/637: Performed by: STUDENT IN AN ORGANIZED HEALTH CARE EDUCATION/TRAINING PROGRAM

## 2019-07-16 RX ORDER — DOCUSATE SODIUM 100 MG/1
100 CAPSULE, LIQUID FILLED ORAL DAILY
Qty: 30 CAP | Refills: 2 | Status: SHIPPED | OUTPATIENT
Start: 2019-07-16 | End: 2019-07-16

## 2019-07-16 RX ORDER — DOCUSATE SODIUM 100 MG/1
100 CAPSULE, LIQUID FILLED ORAL
Qty: 30 CAP | Refills: 0 | Status: SHIPPED | OUTPATIENT
Start: 2019-07-16 | End: 2019-08-15

## 2019-07-16 RX ORDER — DOCUSATE SODIUM 100 MG/1
100 CAPSULE, LIQUID FILLED ORAL DAILY
Status: DISCONTINUED | OUTPATIENT
Start: 2019-07-16 | End: 2019-07-16 | Stop reason: HOSPADM

## 2019-07-16 RX ORDER — IBUPROFEN 800 MG/1
800 TABLET ORAL
Qty: 60 TAB | Refills: 0 | Status: SHIPPED | OUTPATIENT
Start: 2019-07-16

## 2019-07-16 RX ADMIN — DOCUSATE SODIUM 100 MG: 100 CAPSULE, LIQUID FILLED ORAL at 09:31

## 2019-07-16 NOTE — PROGRESS NOTES
Discharge instructions done with mom via cyraphone. [id number]. Time allowed for questions.  Mom verbalized understanding that she has a 6 week pp check-up apt on 8/22/19 at 9:45am.

## 2019-07-16 NOTE — PROGRESS NOTES
Bedside shift change report given to [address] [place] [address] (oncoming nurse) by Luis Morales (offgoing nurse). Report included the following information SBAR and MAR.

## 2019-07-16 NOTE — DISCHARGE INSTRUCTIONS
POST DELIVERY DISCHARGE INSTRUCTIONS    Name: Varun Ramos  YOB: 1985  Primary Diagnosis: Active Problems:    Pregnancy (2019)        General:     Diet/Diet Restrictions:  Eight 8-ounce glasses of fluid daily (water, juices); avoid excessive caffeine intake. Meals/snacks as desired which are high in fiber and carbohydrates and low in fat and cholesterol. Physical Activity / Restrictions / Safety:     Avoid heavy lifting, no more that 8 lbs. For 2-3 weeks; limit use of stairs to 2 times daily for the first week home. No driving for one week. Avoid intercourse 4-6 weeks, no douching or tampon use. Check with obstetrician before starting or resuming an exercise program.         Discharge Instructions/Special Treatment/Home Care Needs:     Continue prenatal vitamins. Continue to use squirt bottle with warm water on your episiotomy after each bathroom use until bleeding stops. If steri-strips applied to your incision, remove in 7-10 days. Call your doctor for the following:     Fever over 101 degrees by mouth. Vaginal bleeding heavier than a normal menstrual period or clot larger than a golf ball. Red streaks or increased swelling of legs, painful red streaks on your breast.  Painful urination, constipation and increased pain or swelling or discharge with your incision. If you feel extremely anxious or overwhelmed. If you have thoughts of harming yourself and/or your baby. Pain Management:     Pain Management:   Take Acetaminophen (Tylenol) or Ibuprofen (Advil, Motrin), as directed for pain. Use a warm Sitz bath 3 times daily to relieve episiotomy or hemorrhoidal discomfort. Heating pad to  incision as needed. For hemorrhoidal discomfort, use Tucks and Anusol cream as needed and directed. Follow-Up Care: Appointment for 2019 at 9:45am at [address].      These are general instructions for a healthy lifestyle:    No smoking/ No tobacco products/ Avoid exposure to second hand smoke    Surgeon General's Warning:  Quitting smoking now greatly reduces serious risk to your health. Obesity, smoking, and sedentary lifestyle greatly increases your risk for illness    A healthy diet, regular physical exercise & weight monitoring are important for maintaining a healthy lifestyle    Recognize signs and symptoms of STROKE:    F-face looks uneven    A-arms unable to move or move unevenly    S-speech slurred or non-existent    T-time-call 911 as soon as signs and symptoms begin-DO NOT go       Back to bed or wait to see if you get better-TIME IS BRAIN. MyChart Activation    Thank you for requesting access to [employer]. Please follow the instructions below to securely access and download your online medical record. [employer] allows you to send messages to your doctor, view your test results, renew your prescriptions, schedule appointments, and more. How Do I Sign Up? 1. In your internet browser, go to [email or web address]  2. Click on the First Time User? Click Here link in the Sign In box. You will be redirect to the New Member Sign Up page. 3. Enter your [employer] Access Code exactly as it appears below. You will not need to use this code after youve completed the sign-up process. If you do not sign up before the expiration date, you must request a new code. [employer] Access Code: [email or web address]  Expires: 2019  9:38 AM (This is the date your [employer] access code will )    4. Enter the last four digits of your Social Security Number (xxxx) and Date of Birth (mm/dd/yyyy) as indicated and click Submit. You will be taken to the next sign-up page. 5. Create a [employer] ID. This will be your [employer] login ID and cannot be changed, so think of one that is secure and easy to remember. 6. Create a [employer] password. You can change your password at any time. 7. Enter your Password Reset Question and Answer.  This can be used at a later time if you forget your password. 8. Enter your e-mail address. You will receive e-mail notification when new information is available in [address] 9. Click Sign Up. You can now view and download portions of your medical record. 10. Click the Download Summary menu link to download a portable copy of your medical information. Additional Information    If you have questions, please visit the Frequently Asked Questions section of the [employer] website at [email or web address] Remember, [employer] is NOT to be used for urgent needs. For medical emergencies, dial 911.

## 2019-07-16 NOTE — DISCHARGE SUMMARY
Obstetrical Discharge Summary     Name: Aidee Arndt MRN: [id number]  SSN: [id number]    YOB: 1985  Age: 28 y.o. Sex: female      Admit Date: 2019    Discharge Date: 2019     Admitting Physician: Rita Fowler MD     Attending Physician:  Dr. Trixie Goltz     Admission Diagnoses: Pregnancy [Z34.90]  Pregnancy [Z34.90]    Discharge Diagnoses:   Information for the patient's :  Malik Hunt [[id number]]   Delivery of a 3.125 kg male infant via Vaginal, Spontaneous on 2019 at 11:07 AM  by Sharan Vazquez. Apgars were 9  and 9 . Additional Diagnoses:   Hospital Problems  Date Reviewed: 2019          Codes Class Noted POA    Pregnancy ICD-10-CM: Z34.90  ICD-9-CM: V22.2  2019 Yes           No results found for: RUBELLAEXT, GRBSEXT    Immunization(s):   Immunization History   Administered Date(s) Administered    Influenza Vaccine (Quad) PF 2019    Tdap 2019        Procedures: none  * No surgery found *      Post-Partum Depression Screening:  [place]  Depression Scale  I have been able to laugh and see the funny side of things: Not quite so much now  I have looked forward with enjoyment to things: Rather less than I used to  I have blamed myself unnecessarily when things went wrong: No, never  I have been anxious or worried for no good reason: No, not at all  I have felt scared or panicky for no very good reason: No, not at all  Things have been getting on top of me: No, I have been coping as well as ever  I have been so unhappy that I have had difficulty sleeping: No, not at all  I have felt sad or miserable: No, not at all  I have been so unhappy that I have been crying: No, never  The thought of harming myself has occurred to me: Never  Total Score: 2    Hospital Course: Normal hospital course following the delivery. Mrs. Yolis Sim is a 27 y/o  speaking female [id number] s/p uncomplicated  at 63A4E.  She came to [address] [place] [address] Hospital  for SROM and painful contractions and was admitted for labor. During deliver she experienced a  2nd degree perineal laceration (repaired) adequately treated with pad and ice post-delivery. Her post-partum course has been uncomplicated. Total QBL of around 410 cc was recorded post delivery. On discharge patient is doing well without significant complaint. Pain is described as minimal on Ibuprofen 800 mg Q8H. Lochia described as scant. She is tolerating a regular diet, ambulating, voiding without difficulty, and passing flatus. Reports some minor constipation but was able to have a small BM o/n. Patient is breast feeding without difficulties. Baby boy is doing well. She denies CP, SOB, n/v, LE pain, RUQ pain, changes in vision, and headache. Patient is stable for DC. Condition at Discharge: Stable    Disposition:  Home with office f/u    Physical Exam  Visit Vitals:   Vitals:    19 2230 07/15/19 0818 07/15/19 1532 07/15/19 2301   BP: 113/55 101/53 131/80 116/75   Pulse: (!) 53 (!) 54 (!) 55 64   Resp: 16 16 16 16   Temp: 98.6 °F (37 °C) 97.9 °F (36.6 °C) 97.8 °F (36.6 °C) 98.7 °F (37.1 °C)   SpO2:       Weight:       Height:          Temp (24hrs), Av.1 °F (36.7 °C), Min:97.8 °F (36.6 °C), Max:98.7 °F (37.1 °C)    BP  Min: 101/53  Max: 131/80     Exam: Patient without distress              CTAB no w/r/r/c              RRR, +S1 and S2, no m/r/g              Abdomen soft, fundus firm below the level of umbilicus, non-tender. Lower extremities are negative for swelling, cords, or tenderness. Prenatal Labs:   No results found for: RUBELLAEXT, GRBSEXT, HBSAGEXT, HIVEXT, RPREXT, GONNOEXT, CHLAMEXT    Patient Medications:   Current Discharge Medication List      START taking these medications    Details   docusate sodium (COLACE) 100 mg capsule Take 1 Cap by mouth daily for 30 days.   Qty: 30 Cap, Refills: 0      ibuprofen (MOTRIN) 800 mg tablet Take 1 Tab by mouth every eight (8) hours as needed for Pain. Qty: 60 Tab, Refills: 0         CONTINUE these medications which have NOT CHANGED    Details   PNV No12-Iron-FA-DSS-OM-3 29 mg iron-1 mg -50 mg CPKD Take  by mouth. Follow-up Care/Patient Instructions: Activity: activity as tolerated  Diet: general  Wound Care: as directed    Reference my discharge instructions.     Follow-up Appointments   Procedures    FOLLOW UP VISIT Appointment in: 6 Weeks Mom  at 945 AM, 6 week follow up Baby  at 80 AM, [place] check     Mom  at 80 AM, 6 week follow up  Baby  at 80 AM, [place] check     Standing Status:   Standing     Number of Occurrences:   1     Order Specific Question:   Appointment in     Answer:   6 Weeks        Signed By:  Yesica Gomez MD    Family Medicine Resident

## 2019-08-21 ENCOUNTER — TELEPHONE (OUTPATIENT)
Dept: FAMILY MEDICINE CLINIC | Age: 34
End: 2019-08-21

## 2019-08-21 NOTE — TELEPHONE ENCOUNTER
----- Message from [employer] sent at 8/21/2019  9:40 AM EDT -----  Regarding: Dr. Ariel Lu  Pt's friend, Shae Patino, is requesting a callback to r/s 08/22/19 appointment to some time next week.  Best contact number is [phone]

## 2019-09-05 ENCOUNTER — ROUTINE PRENATAL (OUTPATIENT)
Dept: FAMILY MEDICINE CLINIC | Age: 34
End: 2019-09-05

## 2019-09-05 VITALS
DIASTOLIC BLOOD PRESSURE: 65 MMHG | BODY MASS INDEX: 27.61 KG/M2 | OXYGEN SATURATION: 99 % | SYSTOLIC BLOOD PRESSURE: 97 MMHG | HEIGHT: 57 IN | WEIGHT: 128 LBS | HEART RATE: 77 BPM | RESPIRATION RATE: 16 BRPM | TEMPERATURE: 98.1 F

## 2019-09-05 DIAGNOSIS — Z23 ENCOUNTER FOR IMMUNIZATION: ICD-10-CM

## 2019-09-05 NOTE — PROGRESS NOTES
Chief Complaint   Patient presents with    Post-Partum Care     discomfort in abdominal region     1. Have you been to the ER, urgent care clinic since your last visit? Hospitalized since your last visit? No    2. Have you seen or consulted any other health care providers outside of the [address] [place] [address] since your last visit? Include any pap smears or colon screening.  No

## 2019-09-05 NOTE — PROGRESS NOTES
Return OB Visit       Subjective:   Angelica Angel 27 y.o. G2 [phone] who is 8+ weeks post partum. 29 YO T0H7 s/p uncomplicated  at 90H8B after SROM. Post partum course complicated by PPH with . Complaints: None  Pain: some pain in abdomen when she works  Lochia: None  Breastfeeding: None  Contraception: Not interested  Depression/ Sadness: Denies    [id number]    Past Medical History - Reviewed today  Patient Active Problem List   Diagnosis Code    Pregnancy Z34.90     Medications - Reviewed today  Current Outpatient Medications   Medication Sig Dispense Refill    ibuprofen (MOTRIN) 800 mg tablet Take 1 Tab by mouth every eight (8) hours as needed for Pain. 60 Tab 0    PNV No12-Iron-FA-DSS-OM-3 29 mg iron-1 mg -50 mg CPKD Take  by mouth. Allergies - Reviewed today  No Known Allergies    Family History - Reviewed today  No family history on file.     Social History - Reviewed today  Social History     Socioeconomic History    Marital status: UNKNOWN     Spouse name: Not on file    Number of children: Not on file    Years of education: Not on file    Highest education level: Not on file   Occupational History    Not on file   Social Needs    Financial resource strain: Not on file    Food insecurity:     Worry: Not on file     Inability: Not on file    Transportation needs:     Medical: Not on file     Non-medical: Not on file   Tobacco Use    Smoking status: Never Smoker    Smokeless tobacco: Never Used   Substance and Sexual Activity    Alcohol use: No     Frequency: Never    Drug use: No    Sexual activity: Yes     Partners: Male   Lifestyle    Physical activity:     Days per week: Not on file     Minutes per session: Not on file    Stress: Not on file   Relationships    Social connections:     Talks on phone: Not on file     Gets together: Not on file     Attends Zoroastrianism service: Not on file     Active member of club or organization: Not on file     Attends meetings of clubs or organizations: Not on file     Relationship status: Not on file    Intimate partner violence:     Fear of current or ex partner: Not on file     Emotionally abused: Not on file     Physically abused: Not on file     Forced sexual activity: Not on file   Other Topics Concern     Service Not Asked    Blood Transfusions Not Asked    Caffeine Concern Not Asked    Occupational Exposure Not Asked    Hobby Hazards Not Asked    Sleep Concern Not Asked    Stress Concern Not Asked    Weight Concern Not Asked    Special Diet Not Asked    Back Care Not Asked    Exercise Not Asked    Bike Helmet Not Asked    [address] Not Asked    Self-Exams Not Asked   Social History Narrative    Not on file     Health Maintenance - Reviewed today   Immunizations:     -Influenza: Due     Screening:     -Pap smear:  neg IEL, neg HPV repeat 5 years    Objective:     Visit Vitals  BP 97/65   Pulse 77   Temp 98.1 °F (36.7 °C) (Oral)   Resp 16   Ht 4' 9\" (1.448 m)   Wt 128 lb (58.1 kg)   SpO2 99%   BMI 27.70 kg/m²       Physical Exam:  GENERAL APPEARANCE: WNWD  LUNGS: CTA no wheezes/ rales/ ronchi  HEART: RRR, no murmurs  ABDOMEN: soft, mild tenderness at fundus of uterus but below umbilcus  EXTREMITIES: no edema or signs of DVT    EPDS - 7    Assessment   S/p  complicated by [facility]      SWEETIE-48-BT ICD-9-CM    1.  (spontaneous vaginal delivery) O80 650    2. Encounter for immunization Z23 V03.89 INFLUENZA VIRUS VAC QUAD,SPLIT,PRESV FREE SYRINGE IM      [place] IMMUNIZ ADMIN,1 SINGLE/COMB VAC/TOXOID     Plan   Post partum check  - Pain minimal and with work, instructed to take it easy and let us know if this worsens  - Lochia - none  - EPDS - 7, denies depression  - Flu shot today    I have discussed the diagnosis with the patient and the intended plan as seen in the above orders. The patient has received an after-visit summary and questions were answered concerning future plans.   I have discussed medication side effects and warnings with the patient as well.     Patient discussed with Dr. Madan Quiros (Attending)    Zenobia Badillo DO  Family Medicine Resident

## 2019-09-05 NOTE — PATIENT INSTRUCTIONS
Vacuna (inactiva o recombinante) contra la influenza (gripe): Lo que debe saber  ¿Por qué vacunarse? La influenza (gripe o el \"flu\") es diego enfermedad contagiosa que se propaga por los [place] cada año, normalmente entre octubre y [place]. La influenza es causada por el virus de influenza, y la mayoría de las veces se propaga a través de tos, estornudos y contacto cercano. Cualquier persona puede contraer la influenza. Los síntomas aparecen repentinamente, y pueden durar varios días. Los síntomas varían según la edad, misbah pueden incluir:  · Verneda Link. · Dolor de garganta. · Dolor muscular. · Cansancio. · Tos. · Dolor de koffi. · Congestión o secreción nasal.  La influenza también puede causar neumonía e infecciones en la [place]', y puede causar diarrea y convulsiones en los niños. Si tiene [employer], last cardiopatía o diego enfermedad en los pulmones, la influenza la puede [place]. La influenza es más grave en [employer]. [place], gente de 72 años de edad o mayores, mujeres embarazadas y gente con ciertas condiciones físicas o un sistema inmunológico debilitado corren mayor riesgo. Cada año miles de formerly [facility] and [place] mueren a causa de la influenza, y [place] más son hospitalizadas. La vacuna contra la influenza puede:  · Prevenir que usted se enferme de la influenza  · Reducir la severidad de la influenza si la contrae, y  · Prevenir que contagie a chahal lela y otras personas con la influenza. Vacunas contra la influenza inactivas y recombinantes  Se recomienda diego dosis de la vacuna contra la influenza cada temporada de influenza. Algunos niños, [place] 6 meses a 8 años de edad, pueden [employer] dosis hilda la misma temporada de influenza. Todos los demás sólo necesitan diego dosis en cada temporada de influenza.   Algunas vacunas antigripales inactivas contienen diego muy pequeña cantidad de timerosal, un preservativo que contiene indira. Los estudios no charles demostrado que el timerosal en las vacunas es dañino, fabián hay vacunas antigripales disponibles que no contienen timerosal.  No hay ningún virus vivo en las inyecciones contra la influenza. No pueden causar la influenza. Hay muchos virus de influenza, y [place] Cada año se formula diego nueva vacuna antigripal para proteger contra 3 o 4 virus que serán los más probables causantes de enfermedad hilda la próxima temporada de influenza. Fabián incluso cuando la vacuna no previene estos virus, todavía puede proporcionar cierto nivel de protección. La vacuna contra la influenza no puede prevenir:  · La influenza causada por un virus que no es protegido por la vacuna o  · Enfermedades que son similares a la influenza fabián no son la influenza. Jenise alrededor de 2 semanas desarrollar protección después de la vacunación, y dicha protección dura a lo nelson de la temporada de la influenza. Tori Stern no deben recibir esta vacuna  Dígale a la persona que lo vacune:  · Si tiene [place] grave y potencialmente mortal.Si ha tenido diego reacción alérgica y potencialmente mortal después de diego vacuna antigripal, o si es gravemente alérgico a cualquier componente de esta vacuna, se le podrá aconsejar que no se vacune. Levy Clymer, fabián no todas, las vacunas antigripales contienen [facility] pequeña cantidad de proteína de [place]. · Si ha tenido el Síndrome de Guillain-Barré (también conocido last GBS). Algunas personas con antecedentes de GBS no deben recibir esta vacuna. Debe consultar a chahal médico sobre esto. · Si no se siente lanie. Normalmente está lanie el ser vacunado contra la influenza cuando está levemente enfermo, fabián es posible que se le pida regresar cuando se sienta mejor. Riesgos de reacción a la vacuna  Igual que cualquier medicamento, incluyendo las vacunas, hay riesgo de efectos secundarios.  Normalmente son leves y se resuelven solos, fabián también pueden ocurrir reacciones graves. [address] que se vacunan contra la influenza no tienen ningún problema con la vacuna. Problemas nabila pueden ocurrir después de la vacuna antigripal inactiva:  · Dolor, enrojecimiento o hinchazón donde recibió la inyección. · Ronquera. · Dolor, enrojecimiento o comezón en los ojos. · Tos. · Valma . · Lequita Sam. · Dolor de koffi. · Comezón. · Cansancio. Si estos problemas ocurren, normalmente comienzan poco después de la vacunación y dumont de 1 a 2 días. Problemas más gravesque pueden ocurrir después de la vacuna antigripal inactiva incluyen:  · Es posible que haya un riesgo un poco mayor de contraer el Síndrome Guillain-Barré (GBS) después de recibir diego vacuna antigripal inactiva. Se estima que elvis riesgo causa 1 ó 2 casos adicionales por cada millón de personas que recibe la vacunación. [place] es mucho charajnit que el riesgo de padecer de complicaciones severas causadas por la influenza, lo cual puede ser prevenido a través de la vacuna contra la influenza. · Los niños pequeños que reciben la vacuna antigripal y la vacuna neumocócica (PCV13) o la vacuna DTaP a la misma vez pueden ser ligeramente más propensos de sufrir convulsiones causadas por fiebre. [place] más información a chahal [place] a chahal médico si el kofi que será vacunado ha tenido convulsiones. Problemas que pueden ocurrir después de cualquier vacuna inyectada:  · Desmayos breves pueden ocurrir después de cualquier procedimiento médico, incluso la vacunación. Para evitar desmayos y heridas causadas por ellos, siéntese o acuéstese por alrededor de 15 minutos. Avísele a chahal médico si se siente mareado o si tiene cambios en chahal visión o zumbido en los oídos. · Algunas personas padecen de un dolor sean y amplitud de movimiento reducida en el hombro del brazo donde se recibió la inyección. [place] ocurre muy raramente. · Cualquier medicamento puede causar diego reacción alérgica grave.  Tales reacciones a diego vacuna ocurren muy raramente, estimados en menos de 1 en un millón de dosis, y normalmente pasa en unos pocos minutos a varias horas después de la vacunación. Ever con Felicity Sharp, hay la posibilidad remota que la vacuna cause daño grave o la muerte. Siempre se supervisa la seguridad de las vacunas. Para más información, visite www.cdc.gov/vaccinesafety/.  William Boland si ocurren reacciones graves? ¿En qué me kain fijar? · Fíjese en cualquier cosa que le preocupe, ever los síntomas de diego reacción alérgica grave, fiebre muy yohan o comportamientos inusuales. Síntomas de [facility] reacción alérgica grave incluyen ronchas, hinchazón de la bin y la garganta, dificultad al respirar, ritmo cardiaco acelerado, mareos y debilidad. Estos síntomas empezarían de unos pocos minutos a unas horas después de la vacunación. ¿Qué kain hacer? · Si ayesha que hay diego reacción alérgica grave u otra emergencia que necesita atención inmediata, llame al 9-1-1 y lleve a la persona al hospital más [facility]. Si no, puede llamar a chahal médico.  · Se debe reportar las reacciones al [employer] de Información sobre Eventos Adversos a Vacunas (VAERS). Chahal médico debe presentar elvis informe, o usted puede hacerlo por el sitio web de VAERS: www.vaers. hhs.gov, o llamando al 7-321.892.3127. VAERS no da consejos médicos. [address] Compensación por Lesiones Causadas por [address] de Compensación por Lesiones Causadas por Vacunas (Vaccine Injury Compensation Program, VICP) es un programa federal creado para compensar a aquellas personas que pueden kelley sido lesionadas por ciertas vacunas. Las personas que creen que posiblemente hayan resultado heridas por diego vacuna pueden encontrar más información sobre el programa y sobre la presentación de reclamos llamando al [phone] o visitando el sitio web del VICP www.[facility].gov/vaccinecompensation. Hay un límite de plazo para presentar un reclamo de indemnización.   ¿Cómo puedo saber más? · Consulte a chahal proveedor de [employer]. Él o soham le puede kenny un folleto con información sobre la vacuna o sugerir otras suazo de [place]. · Llame a chahal departamento de la nettie local o de chahal estado. · Contacte a los Centros para el Control y la Prevención de Enfermedades (Centers for Disease Control and Prevention, CDC):  ? Llame al 0-221.858.5601 (3-123-KIL-INFO) o  ? Visite el sitio web del CDC: www.cdc.gov/flu  Vaccine Information Statement (Interim)  Inactivated Influenza Vaccine  Bengali  08/07/2015  42 [address] J[address]veri [id number]  Department of Health and Human Services  Centers for Disease Control and Prevention  Translation provided by Renetta the Flu  Muchas hojas de información sobre vacunas están disponibles en español y en otros idiomas. Visite www.immunize.org/vis. Las instrucciones de cuidado fueron adaptadas bajo licencia por [employer] (which disclaims liability or warranty for this information). Si usted tiene [place] afección médica o sobre estas instrucciones, siempre pregunte a chahal profesional de nettie.  [facility] niega toda garantía o responsabilidad por chahal uso de esta información.

## 2022-03-20 PROBLEM — Z34.90 PREGNANCY: Status: ACTIVE | Noted: 2019-07-14

## 2024-06-03 NOTE — PROGRESS NOTES
Jayme [facility] [place]  Missed Menses Visit     Subjective:    CC: Here to establish prenatal care following missed menses    HPI: Agustin Contreras is a 38 y.o.  at 7w6d by certain LMP who is being seen today for missed menses, and a positive home pregnancy test last week    She is not yet feeling movements.  She denies bleeding, cramping, nausea, and vomiting.      Taking prenatal vitamins?       OBHx:   2013, , uncomplicated  2019, , uncomplicated  Current    Largest baby: 7lbs  Smallest baby: 5.5lbs  Complications with previous pregnancy: denies, specifically, PTL, preeclampsia, GDM, shoulder dystocia, operative vaginal delivery, 3rd/4th deg tear, PPH, PPD    GynHx: denies abnormal pap; denies STI (including HSV)  PMHx:  denies, specifically, HTN, asthma, DM, VTE, or thrombophilias  PSHx: denies  FHx:  denies, specifically, cancers, heart disease, diabetes, thyroid disease, HTN, preeclampsia, Down syndrome, or other genetic conditions  Social Hx: denies T/E/D during pregnancy  Allergies: NKDA  Medications: PNV  No current outpatient medications on file.     No current facility-administered medications for this visit.        ROS  See HPI.      Objective:  Wt 59.1 kg (130 lb 6.4 oz)     Physical exam:  General appearance: Alert, no acute distress  HEENT: normocephalic, atraumatic   Lungs: unlabored  Heart: well-perfused  Abdomen: soft, non-tender  Extremities: no edema  Skin: no rashes, no lesions      Prenatal Labs:  Pending.    Immunizations:  Flu: When in season.  HBV: pending serologies  Tdap: at 28 weeks        Assessment/ Plan: Agustin Contreras is a 38 y.o.  at 7w6d weeks GA by LMP here for Missed Menses    #Missed Menses  - confirmed by POC hcg  - Will schedule for IOB appt in 2 weeks.  - PNC labs once medicaid is approved.  - Last Pap 2019, NILM. Will repeat once she gets Medicaid  - Sonos: dating sono to be performed at next visit. Will schedule for MFM anatomy at 20 weeks.  -

## 2024-06-04 ENCOUNTER — OFFICE VISIT (OUTPATIENT)
Age: 39
End: 2024-06-04

## 2024-06-04 VITALS
OXYGEN SATURATION: 98 % | DIASTOLIC BLOOD PRESSURE: 53 MMHG | WEIGHT: 130.4 LBS | HEART RATE: 83 BPM | SYSTOLIC BLOOD PRESSURE: 105 MMHG

## 2024-06-04 DIAGNOSIS — Z13.9 ENCOUNTER FOR SCREENING INVOLVING SOCIAL DETERMINANTS OF HEALTH (SDOH): Primary | ICD-10-CM

## 2024-06-04 DIAGNOSIS — N92.6 MISSED MENSES: Primary | ICD-10-CM

## 2024-06-04 DIAGNOSIS — Z59.89 UNINSURED: ICD-10-CM

## 2024-06-04 LAB
HCG, PREGNANCY, URINE, POC: POSITIVE
VALID INTERNAL CONTROL, POC: NORMAL

## 2024-06-04 PROCEDURE — 99213 OFFICE O/P EST LOW 20 MIN: CPT | Performed by: STUDENT IN AN ORGANIZED HEALTH CARE EDUCATION/TRAINING PROGRAM

## 2024-06-04 PROCEDURE — 81025 URINE PREGNANCY TEST: CPT | Performed by: STUDENT IN AN ORGANIZED HEALTH CARE EDUCATION/TRAINING PROGRAM

## 2024-06-04 PROCEDURE — PBSHW AMB POC URINE PREGNANCY TEST, VISUAL COLOR COMPARISON: Performed by: STUDENT IN AN ORGANIZED HEALTH CARE EDUCATION/TRAINING PROGRAM

## 2024-06-04 RX ORDER — PNV NO.95/FERROUS FUM/FOLIC AC 28MG-0.8MG
1 TABLET ORAL DAILY
Qty: 90 TABLET | Refills: 0 | Status: SHIPPED | OUTPATIENT
Start: 2024-06-04 | End: 2024-09-02

## 2024-06-04 SDOH — ECONOMIC STABILITY: INCOME INSECURITY: HOW HARD IS IT FOR YOU TO PAY FOR THE VERY BASICS LIKE FOOD, HOUSING, MEDICAL CARE, AND HEATING?: NOT HARD AT ALL

## 2024-06-04 SDOH — ECONOMIC STABILITY - INCOME SECURITY: OTHER PROBLEMS RELATED TO HOUSING AND ECONOMIC CIRCUMSTANCES: Z59.89

## 2024-06-04 SDOH — ECONOMIC STABILITY: FOOD INSECURITY: WITHIN THE PAST 12 MONTHS, YOU WORRIED THAT YOUR FOOD WOULD RUN OUT BEFORE YOU GOT MONEY TO BUY MORE.: NEVER TRUE

## 2024-06-04 SDOH — ECONOMIC STABILITY: FOOD INSECURITY: WITHIN THE PAST 12 MONTHS, THE FOOD YOU BOUGHT JUST DIDN'T LAST AND YOU DIDN'T HAVE MONEY TO GET MORE.: NEVER TRUE

## 2024-06-04 ASSESSMENT — PATIENT HEALTH QUESTIONNAIRE - PHQ9
SUM OF ALL RESPONSES TO PHQ9 QUESTIONS 1 & 2: 0
SUM OF ALL RESPONSES TO PHQ QUESTIONS 1-9: 0
2. FEELING DOWN, DEPRESSED OR HOPELESS: NOT AT ALL
1. LITTLE INTEREST OR PLEASURE IN DOING THINGS: NOT AT ALL
SUM OF ALL RESPONSES TO PHQ QUESTIONS 1-9: 0

## 2024-06-04 NOTE — PROGRESS NOTES
SW Navigator met with patient to complete initial social needs assessment. Patient verified and confirmed demographics on file.      Review of SDOH:  No social needs at present time.    Medical insurance? uninsured    Psychosocial Hx:  - [place] of origin, [place], [place] in [place]   - Client's feelings about pregnancy, positive  - FOB aware of pregnancy, yes  - Patient and FOB's relationship, coupled  - FOB's feeling about pregnancy, positive  - Lives with spouse and their 2 children ages 11 (daughter) and 5 (son)  - Highest level of Education, 6th grade  - Employment? Not employed, partner employed in construction  - Primary language Scottish  - Prefers written materials in Scottish  - Communication issues, language barrier, can read and write in Scottish only  - WIC? Not enrolled, wants info (will apply once approved for Medicaid)  - Support system, spouse  - OB: Do you have the support needed once baby arrives? yes  - Any other worries or concerns, no     Depression screening:  PHQ2=0  EPDS=0    Personal Safety:  Domestic violence - no hx of domestic violence   General safety - Feels safe in relationship, in home, and in neighborhood    Patient is 39 yo pregnant female. Lives in home with her partner and 2 children ages 11 and 5. Oldest child will be entering 6th grade in the fall; doing well in school. Youngest child will be entering school this year. No SDOH reported. Patient in need of assistance with applying for Medicaid. Enrollment scheduled with TEOF[employer]O. Partner is employed and provides support. Patient plans to formula feed only (did not produce enough milk with both children), not interested in breast pump.     Concern:  1) Uninsured    Plan:  1) Medicaid enrollment with HASEEB Garcia   Navigator

## 2024-06-04 NOTE — PROGRESS NOTES
Agustin Contreras is a 38 y.o. female      Chief Complaint   Patient presents with    Amenorrhea     LMP 4/10/24.  Positive UPT at home.       \"Have you been to the ER, urgent care clinic since your last visit?  Hospitalized since your last visit?\"    NO    “Have you seen or consulted any other health care providers outside of [facility] since your last visit?”    NO     “Have you had a pap smear?”    YES - Where: 5 years ago Nurse/CMA to request most recent records if not in the chart    Date of last Cervical Cancer screen (HPV or PAP): 2/8/2019             Click Here for Release of Records Request    Vitals:    06/04/24 1048   BP: (!) 105/53   Site: Right Upper Arm   Position: Sitting   Cuff Size: Medium Adult   Pulse: 83   SpO2: 98%   Weight: 59.1 kg (130 lb 6.4 oz)           Medication Reconciliation Completed, changes notes. Please Update medication list.

## 2024-06-05 ENCOUNTER — OFFICE VISIT (OUTPATIENT)
Age: 39
End: 2024-06-05

## 2024-06-05 DIAGNOSIS — Z59.89 UNINSURED: Primary | ICD-10-CM

## 2024-06-05 SDOH — ECONOMIC STABILITY - INCOME SECURITY: OTHER PROBLEMS RELATED TO HOUSING AND ECONOMIC CIRCUMSTANCES: Z59.89

## 2024-06-11 NOTE — PROGRESS NOTES
Medicaid enrollment visit with TEOFILO Mooreator via telephone. TEOFILO assisted patient with Medicaid enrollment process via [employer]. Application completed today, roseann #[id number]. [place]. ID uploaded to application. TEOFILO Navigator listed as authorized rep per patient's request/consent.      Patient advised she should hear back from [employer] within 45 days (maybe sooner due to [place] coverage request). Advised to respond to any request for additional documentation promptly and by due date to avoid denial of application.      SW to follow-up with patient in 30 days to check status.     HASEEB Espinosaator

## 2024-06-19 ENCOUNTER — INITIAL PRENATAL (OUTPATIENT)
Age: 39
End: 2024-06-19

## 2024-06-19 ENCOUNTER — OFFICE VISIT (OUTPATIENT)
Age: 39
End: 2024-06-19

## 2024-06-19 ENCOUNTER — HOSPITAL ENCOUNTER (OUTPATIENT)
Facility: HOSPITAL | Age: 39
Setting detail: SPECIMEN
Discharge: HOME | End: 2024-06-22

## 2024-06-19 VITALS
HEIGHT: 61 IN | WEIGHT: 135 LBS | BODY MASS INDEX: 25.49 KG/M2 | OXYGEN SATURATION: 99 % | DIASTOLIC BLOOD PRESSURE: 62 MMHG | SYSTOLIC BLOOD PRESSURE: 96 MMHG | HEART RATE: 97 BPM | RESPIRATION RATE: 13 BRPM | TEMPERATURE: 98.9 F

## 2024-06-19 DIAGNOSIS — Z3A.10 10 WEEKS GESTATION OF PREGNANCY: Primary | ICD-10-CM

## 2024-06-19 DIAGNOSIS — Z78.9 NEED FOR FOLLOW-UP BY SOCIAL WORKER: Primary | ICD-10-CM

## 2024-06-19 LAB
ABO + RH BLD: NORMAL
BILIRUBIN, URINE, POC: NEGATIVE
BLOOD BANK CMNT PATIENT-IMP: NORMAL
BLOOD GROUP ANTIBODIES SERPL: NORMAL
BLOOD URINE, POC: NEGATIVE
GLUCOSE URINE, POC: NEGATIVE
KETONES, URINE, POC: NORMAL
LEUKOCYTE ESTERASE, URINE, POC: NEGATIVE
NITRITE, URINE, POC: NEGATIVE
PH, URINE, POC: 7 (ref 4.6–8)
PROTEIN,URINE, POC: NEGATIVE
SPECIFIC GRAVITY, URINE, POC: 1.02 (ref 1–1.03)
SPECIMEN EXP DATE BLD: NORMAL
URINALYSIS CLARITY, POC: NORMAL
URINALYSIS COLOR, POC: NORMAL
UROBILINOGEN, POC: NORMAL

## 2024-06-19 PROCEDURE — 87661 TRICHOMONAS VAGINALIS AMPLIF: CPT

## 2024-06-19 PROCEDURE — 87491 CHLMYD TRACH DNA AMP PROBE: CPT

## 2024-06-19 PROCEDURE — 81003 URINALYSIS AUTO W/O SCOPE: CPT

## 2024-06-19 PROCEDURE — PBSHW AMB POC URINALYSIS DIP STICK AUTO W/O MICRO

## 2024-06-19 PROCEDURE — 88175 CYTOPATH C/V AUTO FLUID REDO: CPT

## 2024-06-19 PROCEDURE — 0501F PRENATAL FLOW SHEET: CPT

## 2024-06-19 PROCEDURE — 87624 HPV HI-RISK TYP POOLED RSLT: CPT

## 2024-06-19 PROCEDURE — 87591 N.GONORRHOEAE DNA AMP PROB: CPT

## 2024-06-19 RX ORDER — ASPIRIN 81 MG/1
81 TABLET ORAL DAILY
Qty: 90 TABLET | Refills: 1 | Status: SHIPPED | OUTPATIENT
Start: 2024-06-19

## 2024-06-19 NOTE — PROGRESS NOTES
I discussed with the resident the medical history and the resident's findings on physical exam. I discussed with the resident the patient's diagnosis and agree with the plan of care.     37yo  at 11w5 by -    IUP: Rh pos  IOB labs today  anatomy referral placed  dating U/S today  AMA: counseled on ASA    Estimated Date of Delivery: 1/15/25    
Session Code 47257  / :  #[id number]       Chief Complaint   Patient presents with    Routine Prenatal Visit     Pain in her stomach both from the top and the bottom describes it as a mild pain that sometimes goes away but comes back.       Patient identified with 2 patient identifiers (name and D.O.B)    Patient is a  at 10w0d    Leakage of Fluid: NO  Vaginal Bleeding: NO  Prenatal vitamins: YES  Having Contractions: NO  Pain: NO    LMP 04/10/2024 (Approximate)     Immunization History   Administered Date(s) Administered    Influenza, FLUARIX, FLULAVAL, FLUZONE (age 6 mo+) AND AFLURIA, (age 3 y+), PF, 0.5mL 2019, 2019    TDaP, ADACEL (age 10y-64y), BOOSTRIX (age 10y+), IM, 0.5mL 2019       1. Have you been to the ER, urgent care clinic since your last visit?  Hospitalized since your last visit?NO    2. Have you seen or consulted any other health care providers outside of the [facility] since your last visit?  Include any pap smears or colon screening. NO    
Subjective:   Agustin Contreras is a 38 y.o.  who is being seen today for her first obstetrical visit.    This is a planned pregnancy.  She is at 10w0d gestation by LMP (LMP 4/10/24).    See flow sheet for OB history.     History of GDM or DM? No  History of GHTN or HTN? No  History of pre-eclampsia? No  Taking prenatal vitamins? Yes    OBHx:   2013, , uncomplicated  2019, , uncomplicated  Current    Allergies- reviewed:   No Known Allergies    Medications- reviewed:   Current Outpatient Medications   Medication Sig    Prenatal Vit-Fe Fumarate-FA (PRENATAL VITAMINS) 28-0.8 MG TABS Take 1 tablet by mouth daily     No current facility-administered medications for this visit.       Past Medical History- reviewed:  No past medical history on file.    Past Surgical History- reviewed:   No past surgical history on file.    Social History- reviewed:  Social History     Socioeconomic History    Marital status: Unknown     Spouse name: Not on file    Number of children: Not on file    Years of education: Not on file    Highest education level: Not on file   Occupational History    Not on file   Tobacco Use    Smoking status: Never    Smokeless tobacco: Never   Vaping Use    Vaping Use: Never used   Substance and Sexual Activity    Alcohol use: No    Drug use: No    Sexual activity: Yes   Other Topics Concern    Not on file   Social History Narrative    Not on file     Social Determinants of Health     Financial Resource Strain: Low Risk  (2024)    Overall Financial Resource Strain (CARDIA)     Difficulty of Paying Living Expenses: Not hard at all   Food Insecurity: No Food Insecurity (2024)    Hunger Vital Sign     Worried About Running Out of Food in the Last Year: Never true     Ran Out of Food in the Last Year: Never true   Transportation Needs: Not on file   Physical Activity: Not on file   Stress: Not on file   Social Connections: Not on file   Intimate Partner Violence: Not on file   Housing 
Henrique, 11w5d dating  Follow up in 4 weeks, July 15th with Dr. Sparks    Pregnancy Concerns:    # AMA:   - plan to start ASA at 12 weeks, will send prescription    # Uninsured  - Meet with SW today to set up medicaid      Orders Placed This Encounter   Procedures    Culture, Urine     Standing Status:   Future     Number of Occurrences:   1     Standing Expiration Date:   6/19/2025     Order Specific Question:   Specify (ex-cath, midstream, cysto, etc)?     Answer:   clean catch    HIV 1/2 Ag/Ab, 4TH Generation,W Rflx Confirm     Standing Status:   Future     Number of Occurrences:   1     Standing Expiration Date:   6/19/2025    PAP IG, CT-NG-TV, Aptima HPV and rfx 16/18,45 (199315)     Standing Status:   Future     Number of Occurrences:   1     Standing Expiration Date:   6/19/2025     Order Specific Question:   Pap Source?          (Required)     Answer:   endocervical     Order Specific Question:   Pap collection method?          (Required)     Answer:   brush     Order Specific Question:   Menstrual Status ?     Answer:   Pregnant [4]     Order Specific Question:   Previous Treatment?          (Required)     Answer:   NONE    CBC     Standing Status:   Future     Number of Occurrences:   1     Standing Expiration Date:   6/19/2025    Hemoglobinopathy Evaluation     Standing Status:   Future     Number of Occurrences:   1     Standing Expiration Date:   6/19/2025    Hepatitis C Antibody     Standing Status:   Future     Number of Occurrences:   1     Standing Expiration Date:   6/19/2025    Varicella Zoster Antibody, IgG     Standing Status:   Future     Number of Occurrences:   1     Standing Expiration Date:   6/19/2025    Rubella antibody, IgG     Standing Status:   Future     Number of Occurrences:   1     Standing Expiration Date:   6/19/2025    Hepatitis B Surface Antigen     Standing Status:   Future     Number of Occurrences:   1     Standing Expiration Date:   6/19/2025    Hepatitis B Core Antibody,

## 2024-06-19 NOTE — PROGRESS NOTES
Patient seen by TEOFILO Navigator for Medicaid follow-up.    Patient reports has not received any notice from [facility] regarding Medicaid application. SW reminded patient that it can take at least 45 days for processing of application. TEOFILO checked Spout for application status; currently pending status.    Patient to contact TEOFILO 1st week in July to follow-up on application.    No additional needs presented today.    Plan:  Ongoing psychosocial support and resource referral, as desired by the patient and family.      [facility]ole [facility]   Navigator

## 2024-06-20 ENCOUNTER — TELEPHONE (OUTPATIENT)
Age: 39
End: 2024-06-20

## 2024-06-20 LAB
ERYTHROCYTE [DISTWIDTH] IN BLOOD BY AUTOMATED COUNT: 12.8 % (ref 11.5–14.5)
HBV CORE AB SERPL QL IA: NEGATIVE
HBV SURFACE AB SER QL: NONREACTIVE
HBV SURFACE AB SER-ACNC: 4.56 MIU/ML
HBV SURFACE AG SER QL: 0.54 INDEX
HBV SURFACE AG SER QL: NEGATIVE
HCT VFR BLD AUTO: 38.5 % (ref 35–47)
HCV AB SER IA-ACNC: <0.02 INDEX
HCV AB SERPL QL IA: NONREACTIVE
HGB BLD-MCNC: 13.5 G/DL (ref 11.5–16)
HIV 1+2 AB+HIV1 P24 AG SERPL QL IA: NONREACTIVE
HIV 1/2 RESULT COMMENT: NORMAL
MCH RBC QN AUTO: 31.8 PG (ref 26–34)
MCHC RBC AUTO-ENTMCNC: 35.1 G/DL (ref 30–36.5)
MCV RBC AUTO: 90.6 FL (ref 80–99)
NRBC # BLD: 0 K/UL (ref 0–0.01)
NRBC BLD-RTO: 0 PER 100 WBC
PLATELET # BLD AUTO: 192 K/UL (ref 150–400)
PMV BLD AUTO: 10.6 FL (ref 8.9–12.9)
RBC # BLD AUTO: 4.25 M/UL (ref 3.8–5.2)
RPR SER QL: NONREACTIVE
RUBV IGG SERPL IA-ACNC: NORMAL IU/ML
VZV IGG SER IA-ACNC: 1572 INDEX
WBC # BLD AUTO: 11.4 K/UL (ref 3.6–11)

## 2024-06-20 NOTE — TELEPHONE ENCOUNTER
Received an referral to have the pt seen with M. Called pt to schedule appt via  ID # [id number]. No answer, left vm, no active [employer] message.

## 2024-06-21 LAB
BACTERIA SPEC CULT: NORMAL
SERVICE CMNT-IMP: NORMAL

## 2024-06-24 LAB
C TRACH RRNA SPEC QL NAA+PROBE: NEGATIVE
HGB A MFR BLD: 97.1 % (ref 96.4–98.8)
HGB A2 MFR BLD COLUMN CHROM: 2.9 % (ref 1.8–3.2)
HGB F MFR BLD: 0 % (ref 0–2)
HGB FRACT BLD-IMP: NORMAL
HGB S MFR BLD: 0 %
HPV I/H RISK 1 DNA CVX QL PROBE+SIG AMP: NEGATIVE
N GONORRHOEA RRNA SPEC QL NAA+PROBE: NEGATIVE
SPECIMEN SOURCE: NORMAL
T VAGINALIS RRNA SPEC QL NAA+PROBE: NEGATIVE

## 2024-07-05 ENCOUNTER — OFFICE VISIT (OUTPATIENT)
Age: 39
End: 2024-07-05

## 2024-07-05 DIAGNOSIS — Z78.9 NEED FOR FOLLOW-UP BY SOCIAL WORKER: Primary | ICD-10-CM

## 2024-07-05 NOTE — PROGRESS NOTES
Medicaid follow-up.    Patient presented Medicaid renewal application. Per notice from [facility] dated 6/22/24, renewal due by 7/22/24. Patient advised that [place] application may be held up as renewal completion is needed.    SW proceeded to assist patient with renewal process. Renewal application completed during visit and faxed to [employer] at [phone]. Original application given to patient. Advised to await response from DSS regarding prenatal coverage.    Plan:  Ongoing psychosocial support and resource referral, as desired by the patient and family.      Kelly Poole [facility]   Navigator

## 2024-08-12 ENCOUNTER — ROUTINE PRENATAL (OUTPATIENT)
Age: 39
End: 2024-08-12

## 2024-08-12 VITALS
RESPIRATION RATE: 18 BRPM | HEART RATE: 75 BPM | OXYGEN SATURATION: 99 % | SYSTOLIC BLOOD PRESSURE: 96 MMHG | WEIGHT: 135.36 LBS | TEMPERATURE: 98.4 F | DIASTOLIC BLOOD PRESSURE: 60 MMHG | HEIGHT: 61 IN | BODY MASS INDEX: 25.56 KG/M2

## 2024-08-12 DIAGNOSIS — Z34.92 SECOND TRIMESTER PREGNANCY: Primary | ICD-10-CM

## 2024-08-12 DIAGNOSIS — Z78.9 NONIMMUNE TO HEPATITIS B VIRUS: ICD-10-CM

## 2024-08-12 PROCEDURE — 90746 HEPB VACCINE 3 DOSE ADULT IM: CPT

## 2024-08-12 PROCEDURE — 0502F SUBSEQUENT PRENATAL CARE: CPT

## 2024-08-12 PROCEDURE — PBSHW PBB SHADOW CHARGE

## 2024-08-12 PROCEDURE — PBSHW HEP B, ENGERIX-B, (AGE 20 YRS+), IM, 1ML, 3-DOSE

## 2024-08-12 SDOH — ECONOMIC STABILITY: FOOD INSECURITY: WITHIN THE PAST 12 MONTHS, THE FOOD YOU BOUGHT JUST DIDN'T LAST AND YOU DIDN'T HAVE MONEY TO GET MORE.: PATIENT DECLINED

## 2024-08-12 SDOH — ECONOMIC STABILITY: INCOME INSECURITY: HOW HARD IS IT FOR YOU TO PAY FOR THE VERY BASICS LIKE FOOD, HOUSING, MEDICAL CARE, AND HEATING?: PATIENT DECLINED

## 2024-08-12 SDOH — ECONOMIC STABILITY: FOOD INSECURITY: WITHIN THE PAST 12 MONTHS, YOU WORRIED THAT YOUR FOOD WOULD RUN OUT BEFORE YOU GOT MONEY TO BUY MORE.: PATIENT DECLINED

## 2024-08-12 ASSESSMENT — PATIENT HEALTH QUESTIONNAIRE - PHQ9
SUM OF ALL RESPONSES TO PHQ QUESTIONS 1-9: 0
2. FEELING DOWN, DEPRESSED OR HOPELESS: NOT AT ALL
SUM OF ALL RESPONSES TO PHQ QUESTIONS 1-9: 0
1. LITTLE INTEREST OR PLEASURE IN DOING THINGS: NOT AT ALL
SUM OF ALL RESPONSES TO PHQ QUESTIONS 1-9: 0
SUM OF ALL RESPONSES TO PHQ QUESTIONS 1-9: 0
SUM OF ALL RESPONSES TO PHQ9 QUESTIONS 1 & 2: 0

## 2024-08-12 NOTE — PROGRESS NOTES
:[id number]    Chief Complaint   Patient presents with    Routine Prenatal Visit        Patient identified by name and . Patient is a  at 19w3d.    Taking prenatal vitamins: Yes  Leakage of fluid: No  Vaginal bleeding: No  Feeling baby move if over 20 weeks: Yes  Contractions: No  Pain: No    Vitals:    24 1259 24 1311   BP: (!) 99/56 96/60   Site: Right Upper Arm Left Upper Arm   Position: Sitting Sitting   Cuff Size: Small Adult Small Adult   Pulse: 75    Resp: 18    Temp: 98.4 °F (36.9 °C)    TempSrc: Oral    SpO2: 99%    Weight: 61.4 kg (135 lb 5.8 oz)    Height: 1.549 m (5' 1\")         Immunization History   Administered Date(s) Administered    Hep B, ENGERIX-B, (age 20y+), IM, 1mL 2024    Influenza, FLUARIX, FLULAVAL, FLUZONE (age 6 mo+) AND AFLURIA, (age 3 y+), PF, 0.5mL 2019, 2019    TDaP, ADACEL (age 10y-64y), BOOSTRIX (age 10y+), IM, 0.5mL 2019             \"Have you been to the ER, urgent care clinic since your last visit?  Hospitalized since your last visit?\"    NO    “Have you seen or consulted any other health care providers outside of [facility] since your last visit?”    NO            Click Here for Release of Records Request     This patient is accompanied in the office by her self.  I have received verbal consent from Agustin Contreras to discuss any/all medical information while they are present in the room.  
I discussed with the resident the medical history and the resident's findings on physical exam. I discussed with the resident the patient's diagnosis and agree with the plan of care.     39yo  at 19w3d by     IUP: Rh pos  NIPT once medicaid approved, anatomy scheduled    AMA: counseled on ASA  Hep B NI: will get first dose of vax today     Estimated Date of Delivery: 1/15/25    
Nonimmune to hepatitis B virus  Hep B, ENGERIX-B, (age 20 yrs+), IM, 1mL, 3-dose            Plan   38 y.o.  at 19w3d by 11w US, SCOUT Estimated Date of Delivery: 1/3/25 here for return OB visit.     SIUP: IOB labs: B POSITIVE   Ab screen neg, HIV/RPR/HCV/HBV/GC/CT negative, Rubella/VZV immune, CBC 13.5, Hgb fract wnl, UA/Ucx negative, pap NILM HPV neg  - Pt aware of pooja scan on 24  - Continue PNV  - Genetic screening to be completed after approved for Medicaid   - Follow up 4 weeks    AMA: counseled on ASA   - continue ASA 81mg qD    Hepatitis B nonimmune: noted on initial labs  - gave Hepatitis B vaccination today   - second dose ~24, third dose ~25    ---------------------------------------  Continuity Provider: Tuck  Pain mgmt. in labor: TBD  Lactation/UBB: TBD  Feeding: TBD  Circ:  TBD  BC Plan: TBD  ---------------------------------------    Orders Placed This Encounter   Procedures    Hep B, ENGERIX-B, (age 20 yrs+), IM, 1mL, 3-dose     Labor precautions discussed, including: Regular painful contractions, lasting for greater than one hour, taking your breath away; any vaginal bleeding; any leakage of fluid; or absent or decreased fetal movement. Call M.D. on call if any of these symptoms or signs occur.    I have discussed the diagnosis with the patient and the intended plan as seen in the above orders.  The patient has received an after-visit summary and questions were answered concerning future plans.  I have discussed medication side effects and warnings with the patient as well. Informed pt to return to the office or go to the ER if she experiences vaginal bleeding, vaginal discharge, leaking of fluid, pelvic cramping.    Pt seen and discussed with Dr. Steele (attending physician)    Briana Woody MD  Family Medicine Resident

## 2024-08-28 ENCOUNTER — ROUTINE PRENATAL (OUTPATIENT)
Age: 39
End: 2024-08-28

## 2024-08-28 VITALS — HEART RATE: 103 BPM | DIASTOLIC BLOOD PRESSURE: 58 MMHG | SYSTOLIC BLOOD PRESSURE: 96 MMHG

## 2024-08-28 DIAGNOSIS — Z3A.21 21 WEEKS GESTATION OF PREGNANCY: Primary | ICD-10-CM

## 2024-08-28 PROCEDURE — 99203 OFFICE O/P NEW LOW 30 MIN: CPT | Performed by: OBSTETRICS & GYNECOLOGY

## 2024-08-28 PROCEDURE — 76811 OB US DETAILED SNGL FETUS: CPT | Performed by: OBSTETRICS & GYNECOLOGY

## 2024-08-29 NOTE — PROCEDURES
PATIENT: KERMIT RAINES   -  : 1985   -  DOS:2024   -  INTERPRETING PROVIDER:Bertha Nickerson,   Indication  ========    Anatomy, AMA    Method  ======    Transabdominal ultrasound examination. View: Good view    Dating  ======    LMP on: 4/10/2024  GA by LMP 20 w + 0 d  SCOUT by LMP: 1/15/2025  Previous Ultrasound on: 2024  Type of prior assessment: GA  GA at prior assessment date 11 w + 5 d  GA by previous U/S 21 w + 5 d  SCOUT by previous Ultrasound: 1/3/2025  Ultrasound examination on: 2024  GA by U/S based upon: AC, BPD, Femur, HC  GA by U/S 21 w + 3 d  SCOUT by U/S: 2025  Assigned: based on ultrasound (GA), selected on 2024  Assigned GA 21 w + 5 d  Assigned SCOUT: 1/3/2025    Fetal Growth Overview  =================    Exam date        GA              BPD (mm)         HC (mm)         AC (mm)              FL (mm)             HL (mm)             EFW (g)  2024        21w 5d        49     16%         184    8%        171.9     57%        36.9    41%        34.8     56%        452    48%    Fetal Biometry  ============    Standard  BPD 49.0 mm 20w 6d 16% Hadlock  OFD 66.3 mm 22w 2d 72% Sarkis  .0 mm 20w 5d 8% Hadlock  Cerebellum tr 22.1 mm 20w 5d 34% Hill  Nuchal fold 4.7 mm  .9 mm 22w 1d 57% Hadlock  Femur 36.9 mm 21w 5d 41% Hadlock  Humerus 34.8 mm 21w 6d 56% Sarkis   g 21w 5d 48% Hadlock  EFW (lb) 1 lb  EFW (oz) 0 oz  EFW by: Hadlock (BPD-HC-AC-FL)  Extended   6.0 mm  CM 3.0 mm  1% Nicolaides  Nasal bone 6.7 mm  Head / Face / Neck  Nasal bone: present  Other Structures   bpm    General Evaluation  ==============    Cardiac activity present.  bpm. Fetal movements: visualized. Presentation: Cephalic  Placenta: Placental site: posterior, appropriate distance from the internal os. Placental edge-to-cervical os distance 3.7 cm  Umbilical cord: Cord vessels: 3 vessel cord. Insertion site: central  Amniotic fluid: Amount of AF: normal. MVP 3.8  pregnancy at 21w 5d by clinical dates.  EFW is 452 g at 48% and AC at 57%.  Fetal and maternal anatomy visualized as stated above.  Amniotic fluid: normal.  Placenta is posterior, appropriate distance from the internal os.  Cephalic presentation.    Consultation  ==========     Javad #[id number] was used to conduct this visit.    MEIR is a 38-yo (39 at EDC)  with a hx of 2 uncomplicated SVDs. She reports regular menses prior to this pregnancy. She takes ldASA. She does not report  other significant history. She plans cfDNA pending Medicaid coverage.    Patient was counseled on the findings. Risks/benefits of amniocentesis were reviewed which she declines. Questions and concerns were addressed.    Excluding time reading the ultrasound, a total of 40 minutes was spent on this visit reviewing previous notes, counseling the patient and documenting the findings in the  note.    Recommendations  ==============    3rd trimester scan    Coding  ======    Code: 99426  Description: Ultrasound, pregnant uterus, real time with image documentation, fetal and maternal evaluation plus detailed fetal anatomic examination, transabdominal  approach;single or first gestation

## 2024-09-09 ENCOUNTER — OFFICE VISIT (OUTPATIENT)
Age: 39
End: 2024-09-09

## 2024-09-09 ENCOUNTER — ROUTINE PRENATAL (OUTPATIENT)
Age: 39
End: 2024-09-09

## 2024-09-09 VITALS
BODY MASS INDEX: 25.89 KG/M2 | RESPIRATION RATE: 16 BRPM | SYSTOLIC BLOOD PRESSURE: 103 MMHG | OXYGEN SATURATION: 100 % | WEIGHT: 137 LBS | DIASTOLIC BLOOD PRESSURE: 64 MMHG | HEART RATE: 88 BPM

## 2024-09-09 DIAGNOSIS — Z59.89 UNINSURED: Primary | ICD-10-CM

## 2024-09-09 DIAGNOSIS — Z78.9 NONIMMUNE TO HEPATITIS B VIRUS: ICD-10-CM

## 2024-09-09 DIAGNOSIS — R05.1 ACUTE COUGH: ICD-10-CM

## 2024-09-09 DIAGNOSIS — Z78.9 NEED FOR FOLLOW-UP BY SOCIAL WORKER: ICD-10-CM

## 2024-09-09 DIAGNOSIS — O09.522 AMA (ADVANCED MATERNAL AGE) MULTIGRAVIDA 35+, SECOND TRIMESTER: ICD-10-CM

## 2024-09-09 DIAGNOSIS — Z59.89 UNINSURED: ICD-10-CM

## 2024-09-09 DIAGNOSIS — Z23 ENCOUNTER FOR IMMUNIZATION: ICD-10-CM

## 2024-09-09 DIAGNOSIS — Z3A.23 PREGNANCY WITH 23 COMPLETED WEEKS GESTATION: Primary | ICD-10-CM

## 2024-09-09 PROCEDURE — 90746 HEPB VACCINE 3 DOSE ADULT IM: CPT

## 2024-09-09 PROCEDURE — 0502F SUBSEQUENT PRENATAL CARE: CPT

## 2024-09-09 RX ORDER — GUAIFENESIN 600 MG/1
600 TABLET, EXTENDED RELEASE ORAL 2 TIMES DAILY
Qty: 30 TABLET | Refills: 0 | Status: SHIPPED | OUTPATIENT
Start: 2024-09-09 | End: 2024-09-24

## 2024-09-09 SDOH — ECONOMIC STABILITY - INCOME SECURITY: OTHER PROBLEMS RELATED TO HOUSING AND ECONOMIC CIRCUMSTANCES: Z59.89

## 2024-09-09 ASSESSMENT — PATIENT HEALTH QUESTIONNAIRE - PHQ9
SUM OF ALL RESPONSES TO PHQ9 QUESTIONS 1 & 2: 0
1. LITTLE INTEREST OR PLEASURE IN DOING THINGS: NOT AT ALL
SUM OF ALL RESPONSES TO PHQ QUESTIONS 1-9: 0
SUM OF ALL RESPONSES TO PHQ QUESTIONS 1-9: 0
2. FEELING DOWN, DEPRESSED OR HOPELESS: NOT AT ALL
SUM OF ALL RESPONSES TO PHQ QUESTIONS 1-9: 0
SUM OF ALL RESPONSES TO PHQ QUESTIONS 1-9: 0

## 2024-09-11 ENCOUNTER — TELEPHONE (OUTPATIENT)
Age: 39
End: 2024-09-11

## 2024-09-13 ENCOUNTER — ROUTINE PRENATAL (OUTPATIENT)
Age: 39
End: 2024-09-13

## 2024-09-13 ENCOUNTER — OFFICE VISIT (OUTPATIENT)
Age: 39
End: 2024-09-13

## 2024-09-13 VITALS — HEART RATE: 87 BPM | SYSTOLIC BLOOD PRESSURE: 102 MMHG | DIASTOLIC BLOOD PRESSURE: 64 MMHG

## 2024-09-13 DIAGNOSIS — Z78.9 NEED FOR COMMUNITY RESOURCE: Primary | ICD-10-CM

## 2024-09-13 DIAGNOSIS — Z3A.24 24 WEEKS GESTATION OF PREGNANCY: Primary | ICD-10-CM

## 2024-09-13 DIAGNOSIS — O09.522 SUPERVISION OF ELDERLY MULTIGRAVIDA IN SECOND TRIMESTER: Primary | ICD-10-CM

## 2024-09-13 DIAGNOSIS — Z3A.24 24 WEEKS GESTATION OF PREGNANCY: ICD-10-CM

## 2024-09-30 ENCOUNTER — ROUTINE PRENATAL (OUTPATIENT)
Age: 39
End: 2024-09-30
Payer: MEDICAID

## 2024-09-30 ENCOUNTER — ROUTINE PRENATAL (OUTPATIENT)
Age: 39
End: 2024-09-30

## 2024-09-30 VITALS
DIASTOLIC BLOOD PRESSURE: 60 MMHG | SYSTOLIC BLOOD PRESSURE: 96 MMHG | OXYGEN SATURATION: 97 % | WEIGHT: 138 LBS | TEMPERATURE: 98.2 F | HEART RATE: 73 BPM | BODY MASS INDEX: 26.06 KG/M2 | HEIGHT: 61 IN

## 2024-09-30 DIAGNOSIS — O09.522 SUPERVISION OF ELDERLY MULTIGRAVIDA IN SECOND TRIMESTER: Primary | ICD-10-CM

## 2024-09-30 DIAGNOSIS — Z3A.26 26 WEEKS GESTATION OF PREGNANCY: Primary | ICD-10-CM

## 2024-09-30 PROCEDURE — 0502F SUBSEQUENT PRENATAL CARE: CPT

## 2024-09-30 PROCEDURE — 76811 OB US DETAILED SNGL FETUS: CPT | Performed by: OBSTETRICS & GYNECOLOGY

## 2024-09-30 ASSESSMENT — PATIENT HEALTH QUESTIONNAIRE - PHQ9
2. FEELING DOWN, DEPRESSED OR HOPELESS: NOT AT ALL
SUM OF ALL RESPONSES TO PHQ QUESTIONS 1-9: 0
SUM OF ALL RESPONSES TO PHQ QUESTIONS 1-9: 0
SUM OF ALL RESPONSES TO PHQ9 QUESTIONS 1 & 2: 0
1. LITTLE INTEREST OR PLEASURE IN DOING THINGS: NOT AT ALL
SUM OF ALL RESPONSES TO PHQ QUESTIONS 1-9: 0
SUM OF ALL RESPONSES TO PHQ QUESTIONS 1-9: 0

## 2024-10-01 LAB
BASOPHILS # BLD: 0 K/UL (ref 0–0.1)
BASOPHILS NFR BLD: 0 % (ref 0–1)
DIFFERENTIAL METHOD BLD: ABNORMAL
EOSINOPHIL # BLD: 0.2 K/UL (ref 0–0.4)
EOSINOPHIL NFR BLD: 2 % (ref 0–7)
ERYTHROCYTE [DISTWIDTH] IN BLOOD BY AUTOMATED COUNT: 12.9 % (ref 11.5–14.5)
GLUCOSE 1H P 100 G GLC PO SERPL-MCNC: 123 MG/DL (ref 65–140)
HCT VFR BLD AUTO: 34.2 % (ref 35–47)
HGB BLD-MCNC: 11.3 G/DL (ref 11.5–16)
IMM GRANULOCYTES # BLD AUTO: 0.1 K/UL (ref 0–0.04)
IMM GRANULOCYTES NFR BLD AUTO: 1 % (ref 0–0.5)
LYMPHOCYTES # BLD: 2.2 K/UL (ref 0.8–3.5)
LYMPHOCYTES NFR BLD: 24 % (ref 12–49)
MCH RBC QN AUTO: 31.9 PG (ref 26–34)
MCHC RBC AUTO-ENTMCNC: 33 G/DL (ref 30–36.5)
MCV RBC AUTO: 96.6 FL (ref 80–99)
MONOCYTES # BLD: 0.8 K/UL (ref 0–1)
MONOCYTES NFR BLD: 8 % (ref 5–13)
NEUTS SEG # BLD: 6 K/UL (ref 1.8–8)
NEUTS SEG NFR BLD: 65 % (ref 32–75)
NRBC # BLD: 0 K/UL (ref 0–0.01)
NRBC BLD-RTO: 0 PER 100 WBC
PLATELET # BLD AUTO: 194 K/UL (ref 150–400)
PMV BLD AUTO: 10.7 FL (ref 8.9–12.9)
RBC # BLD AUTO: 3.54 M/UL (ref 3.8–5.2)
WBC # BLD AUTO: 9.2 K/UL (ref 3.6–11)

## 2024-10-01 NOTE — PROCEDURES
PATIENT: KERMIT RAINES   -  : 1985   -  DOS:2024   -  INTERPRETING PROVIDER:Bertha Nickerson,   Indication  ========    AMA    Method  ======    Transabdominal ultrasound examination. View: Sufficient    Pregnancy  =========    Singh pregnancy. Number of fetuses: 1    Dating  ======    LMP on: 4/10/2024  GA by LMP 24 w + 5 d  SCOUT by LMP: 1/15/2025  Previous Ultrasound on: 2024  Type of prior assessment: GA  GA at prior assessment date 11 w + 5 d  GA by previous U/S 26 w + 3 d  SCOUT by previous Ultrasound: 1/3/2025  Ultrasound examination on: 2024  GA by U/S based upon: AC, BPD, Femur, HC  GA by U/S 27 w + 1 d  SCOUT by U/S: 2024  Assigned: based on ultrasound (GA), selected on 2024  Assigned GA 26 w + 3 d  Assigned SCOUT: 1/3/2025    Fetal Biometry  ============    Standard  BPD 65.5 mm 26w 3d 40% Hadlock  OFD 90.2 mm 29w 0d 98% Sarkis  .9 mm 27w 1d 48% Hadlock  .4 mm 27w 3d 71% Hadlock  Femur 51.7 mm 27w 4d 71% Hadlock  EFW 1,067 g 27w 1d 77% Hadlock  EFW (lb) 2 lb  EFW (oz) 6 oz  EFW by: Hadlock (BPD-HC-AC-FL)  Extended   3.7 mm  Head / Face / Neck  Nasal bone: present  Other Structures   bpm    General Evaluation  ==============    Cardiac activity present.  bpm. Fetal movements: visualized. Presentation: BREECH  Placenta: Placental site: posterior, appropriate distance from the internal os  Umbilical cord: Cord vessels: 3 vessel cord. Insertion site: central  Amniotic fluid: Amount of AF: normal. MVP 5.6 cm    Fetal Anatomy  ===========    Face  Nasal bone: present  3-vessel view: normal  Stomach: normal  Kidneys: normal  Bladder: normal    Maternal Structures  ===============    Uterus / Cervix  Cervix: Normal  Approach: Transabdominal  Cervical length 3.75 cm    Findings  =======    Intrauterine Singh pregnancy at 26w 3d by clinical dates.  EFW is 1067 g at 77%, abdominal circumference at 71%.  Anatomy visualized as stated above.  Amniotic

## 2024-10-04 NOTE — PROGRESS NOTES
#[id number]    Chief Complaint   Patient presents with    Routine Prenatal Visit       26w3d    Cough for a week, producing mucus, no sore throat        Patient identified by name and . Patient is a  at 26w3d.    Taking prenatal vitamins: Yes  Leakage of fluid: No  Vaginal bleeding: No  Feeling baby move if over 20 weeks: Yes  Contractions: No  Pain: No    Vitals:    24 1307   BP: 96/60   Site: Left Upper Arm   Position: Sitting   Cuff Size: Medium Adult   Pulse: 73   Temp: 98.2 °F (36.8 °C)   TempSrc: Oral   SpO2: 97%   Weight: 62.6 kg (138 lb)   Height: 1.549 m (5' 1\")        Immunization History   Administered Date(s) Administered    Hep B, ENGERIX-B, (age 20y+), IM, 1mL 2024, 2024    Influenza, FLUARIX, FLULAVAL, FLUZONE (age 6 mo+) and AFLURIA, (age 3 y+), Quadv PF, 0.5mL 2019, 2019    TDaP, ADACEL (age 10y-64y), BOOSTRIX (age 10y+), IM, 0.5mL 2019       1. Have you been to the ER, urgent care clinic since your last visit?  Hospitalized since your last visit?No    2. Have you seen or consulted any other health care providers outside of the [facility] since your last visit?  Include any pap smears or colon screening. No          
I reviewed with the resident the medical history and the resident's findings on the physical examination.  I discussed with the resident the patient's diagnosis and concur with the plan.    
declined     Ran Out of Food in the Last Year: Patient declined   Transportation Needs: Unknown (2024)    PRAPARE - Transportation     Lack of Transportation (Medical): Not on file     Lack of Transportation (Non-Medical): No   Physical Activity: Not on file   Stress: Not on file   Social Connections: Not on file   Intimate Partner Violence: Not on file   Housing Stability: Unknown (2024)    Housing Stability Vital Sign     Unable to Pay for Housing in the Last Year: Not on file     Number of Times Moved in the Last Year: Not on file     Homeless in the Last Year: No     Immunizations- reviewed:   Immunization History   Administered Date(s) Administered    Hep B, ENGERIX-B, (age 20y+), IM, 1mL 2024, 2024    Influenza, FLUARIX, FLULAVAL, FLUZONE (age 6 mo+) and AFLURIA, (age 3 y+), Quadv PF, 0.5mL 2019, 2019    TDaP, ADACEL (age 10y-64y), BOOSTRIX (age 10y+), IM, 0.5mL 2019       OB History- reviewed:  OB History    Para Term  AB Living   3 2 2     2   SAB IAB Ectopic Molar Multiple Live Births             2      # Outcome Date GA Lbr Alejandro/2nd Weight Sex Type Anes PTL Lv   3 Current            2 Term 19 39w1d 13:45 / 00:52 3.125 kg (6 lb 14.2 oz) M Vag-Spont EPI N YOANNA   1 Term 13 40w0d  3.6 kg (7 lb 15 oz) F Vag-Spont None N YOANNA        Objective:   BP 96/60 (Site: Left Upper Arm, Position: Sitting, Cuff Size: Medium Adult)   Pulse 73   Temp 98.2 °F (36.8 °C) (Oral)   Ht 1.549 m (5' 1\")   Wt 62.6 kg (138 lb)   LMP 04/10/2024 (Approximate)   SpO2 97%   BMI 26.07 kg/m²     Physical Exam:  GENERAL APPEARANCE: alert, well appearing, in no apparent distress  ABDOMEN: gravid, Fundal height at 26 cm, FHT present at an average of 150 bpm  PSYCH: normal mood and affect  SVE: N/A    Labs  No results found for this or any previous visit (from the past 12 hour(s)).    Assessment        Diagnosis Orders   1. 26 weeks gestation of pregnancy  [facility]

## 2024-10-07 LAB
Lab: NORMAL
NTRA FETAL FRACTION: NORMAL
NTRA GENDER OF FETUS: NORMAL
NTRA MONOSOMY X AGE-BASED RISK TEXT: NORMAL
NTRA MONOSOMY X RESULT TEXT: NORMAL
NTRA MONOSOMY X RISK SCORE TEXT: NORMAL
NTRA TRIPLOIDY RESULT TEXT: NORMAL
NTRA TRISOMY 13 AGE-BASED RISK TEXT: NORMAL
NTRA TRISOMY 13 RESULT TEXT: NORMAL
NTRA TRISOMY 13 RISK SCORE TEXT: NORMAL
NTRA TRISOMY 18 AGE-BASED RISK TEXT: NORMAL
NTRA TRISOMY 18 RESULT TEXT: NORMAL
NTRA TRISOMY 18 RISK SCORE TEXT: NORMAL
NTRA TRISOMY 21 AGE-BASED RISK TEXT: NORMAL
NTRA TRISOMY 21 RESULT TEXT: NORMAL
NTRA TRISOMY 21 RISK SCORE TEXT: NORMAL

## 2024-10-09 LAB
Lab: NEGATIVE
Lab: NORMAL
NTRA ALPHA-THALASSEMIA: NEGATIVE
NTRA BETA-HEMOGLOBINOPATHIES: NEGATIVE
NTRA CANAVAN DISEASE: NEGATIVE
NTRA CYSTIC FIBROSIS: NEGATIVE
NTRA DUCHENNE/BECKER MUSCULAR DYSTROPHY: NEGATIVE
NTRA FAMILIAL DYSAUTONOMIA: NEGATIVE
NTRA FRAGILE X SYNDROME: NEGATIVE
NTRA GALACTOSEMIA: NEGATIVE
NTRA GAUCHER DISEASE: NEGATIVE
NTRA MEDIUM CHAIN ACYL-COA DEHYDROGENASE DEFICIENCY: NEGATIVE
NTRA POLYCYSTIC KIDNEY DISEASE, AUTOSOMAL RECESSIVE: NEGATIVE
NTRA SMITH-LEMLI-OPITZ SYNDROME: NEGATIVE
NTRA SPINAL MUSCULAR ATROPHY: NEGATIVE
NTRA TAY-SACHS DISEASE: NEGATIVE

## 2024-10-28 NOTE — PROGRESS NOTES
Jayme Todd [facility]  Routine Prenatal Visit        39 y.o.  at 30w4d by 11ws here for BELLO. Doing well, no complaints. -CTX, +FM, -vaginal bleeding or discharge, -PreE sxs including HA, Vision changes CP/SOB, RUQ pain, or SHOLA.      BP Readings from Last 3 Encounters:   24 96/60   24 102/64   24 103/64       Wt Readings from Last 10 Encounters:   24 62.6 kg (138 lb)   24 62.1 kg (137 lb)   24 61.4 kg (135 lb 5.8 oz)   24 61.2 kg (135 lb)   24 59.1 kg (130 lb 6.4 oz)   19 55.8 kg (123 lb)         FH: 30  FHT: 150       #BELLO:   - Labs reviewed - hgb 11.3; Rh+ and ab screen neg; HIV, syphilis NR, GC/Chlaymydia neg; Hep C neg; Hep B Ag neg, and Hep B nonimmune; rubella and VZV immune;   early GTT not done; GTT @ 24-28wks wnl; TSH not done; Ucx no growth; NIPT LR and Carrier Screening neg; Hemoglobin evaluation wnl. GBS to be collected within 5 weeks of anticipated delivery.  - Last Pap , NILM -HRHPV  - Sonos reviewed. anatomy sono wnl. Growth EFW/AC 77/71%ile at 26weeks.  - Immunizations: flu refuses; Tdap at 28 weeks refuses (she believes she was already given this though it is not on record); Hep B 2 of 3 given  - Indications for Aspirin: SES, AMA  - Taking PNV.  - Contraception plan: TBD  - Anticipated mode of delivery: vaginal      # HBV NI  - 3rd dose due 3/2025    #AMA  - 39 at the time of delivery  - ASA, NIPT LR, MFM sono wnl, 3rd tri APT and growth        Follow up in 2 weeks.  Next appointment scheduled Visit date not found    Gallito Penny MD, MPH  [facility]

## 2024-10-29 ENCOUNTER — ROUTINE PRENATAL (OUTPATIENT)
Age: 39
End: 2024-10-29

## 2024-10-29 ENCOUNTER — ROUTINE PRENATAL (OUTPATIENT)
Age: 39
End: 2024-10-29
Payer: MEDICAID

## 2024-10-29 VITALS
HEART RATE: 79 BPM | TEMPERATURE: 98.5 F | BODY MASS INDEX: 26.85 KG/M2 | DIASTOLIC BLOOD PRESSURE: 68 MMHG | WEIGHT: 142.2 LBS | HEIGHT: 61 IN | SYSTOLIC BLOOD PRESSURE: 103 MMHG | OXYGEN SATURATION: 98 %

## 2024-10-29 VITALS — HEART RATE: 84 BPM | SYSTOLIC BLOOD PRESSURE: 100 MMHG | DIASTOLIC BLOOD PRESSURE: 63 MMHG

## 2024-10-29 DIAGNOSIS — Z3A.30 30 WEEKS GESTATION OF PREGNANCY: Primary | ICD-10-CM

## 2024-10-29 DIAGNOSIS — O09.93 PREGNANCY, SUPERVISION, HIGH-RISK, THIRD TRIMESTER: Primary | ICD-10-CM

## 2024-10-29 PROCEDURE — 76816 OB US FOLLOW-UP PER FETUS: CPT | Performed by: OBSTETRICS & GYNECOLOGY

## 2024-10-29 PROCEDURE — 0502F SUBSEQUENT PRENATAL CARE: CPT | Performed by: STUDENT IN AN ORGANIZED HEALTH CARE EDUCATION/TRAINING PROGRAM

## 2024-10-29 NOTE — PROGRESS NOTES
Agustin Contreras is a 39 y.o. female      Chief Complaint   Patient presents with    Routine Prenatal Visit     30w 4d .  No vaginal bleeding or abnormal discharge. Positive fetal movement. No  contractions.         \"Have you been to the ER, urgent care clinic since your last visit?  Hospitalized since your last visit?\"    NO    “Have you seen or consulted any other health care providers outside of [facility] since your last visit?”    NO    No breast cancer screening on file             Click Here for Release of Records Request    Vitals:    10/29/24 1258   BP: 103/68   Site: Right Upper Arm   Position: Sitting   Cuff Size: Medium Adult   Pulse: 79   Temp: 98.5 °F (36.9 °C)   TempSrc: Oral   SpO2: 98%   Weight: 64.5 kg (142 lb 3.2 oz)   Height: 1.549 m (5' 0.98\")           Medication Reconciliation Completed, changes notes. Please Update medication list.

## 2024-11-08 NOTE — PROCEDURES
PATIENT: KERMIT RAINES   -  : 1985   -  DOS:10/29/2024   -  INTERPRETING PROVIDER:Bertha Nickerson,   Indication  ========    AMA    Method  ======    Transabdominal ultrasound examination. View: Sufficient    Pregnancy  =========    Singh pregnancy. Number of fetuses: 1    Dating  ======    LMP on: 4/10/2024  GA by LMP 28 w + 6 d  SCOUT by LMP: 1/15/2025  Previous Ultrasound on: 2024  Type of prior assessment: GA  GA at prior assessment date 11 w + 5 d  GA by previous U/S 30 w + 4 d  SCOUT by previous Ultrasound: 1/3/2025  Ultrasound examination on: 10/29/2024  GA by U/S based upon: AC, BPD, Femur, HC  GA by U/S 31 w + 3 d  SCOUT by U/S: 2024  Assigned: based on ultrasound (GA), selected on 2024  Assigned GA 30 w + 4 d  Assigned SCOUT: 1/3/2025    Fetal Biometry  ============    Standard  BPD 77.1 mm 31w 0d 50% Hadlock  .8 mm 33w 0d 95% Sarkis  .2 mm 31w 3d 38% Hadlock  .6 mm 31w 5d 79% Hadlock  Femur 60.1 mm 31w 2d 56% Hadlock  Humerus 53.7 mm 31w 2d 72% Sarkis  EFW 1,777 g 31w 1d 69% Hadlock  EFW (lb) 3 lb  EFW (oz) 15 oz  EFW by: Hadlock (BPD-HC-AC-FL)  Extended   4.5 mm  Head / Face / Neck  Nasal bone: present  Other Structures   bpm    General Evaluation  ==============    Cardiac activity present.  bpm. Fetal movements: visualized. Presentation: Cephalic  Placenta: Placental site: posterior, appropriate distance from the internal os  Umbilical cord: Cord vessels: 3 vessel cord. Insertion site: central  Amniotic fluid: Amount of AF: normal. MVP 5.8 cm. NE 14.0 cm. Q1 0.0 cm, Q2 5.0 cm, Q3 5.8 cm, Q4 3.2 cm    Fetal Anatomy  ===========    Face  Nasal bone: present  4-chamber view: normal  3-vessel view: normal  Stomach: normal  Kidneys: normal  Bladder: normal    Findings  =======    Intrauterine Singh pregnancy at 30w 4d by clinical dates.  EFW is 1777 g at 69%, abdominal circumference at 79%.  Anatomy visualized as stated above.  Amniotic fluid is

## 2024-11-12 ENCOUNTER — TELEPHONE (OUTPATIENT)
Age: 39
End: 2024-11-12

## 2024-11-12 NOTE — TELEPHONE ENCOUNTER
Find out if patient is requesting switch to zolpidem.   Patient is scheduled to see you tomorrow. She dropped off a medical clearance for dental treatment form to get filled out for her appointment at the dentist office on Friday. I have placed the form in Aleisha folder in the black box.

## 2024-11-25 ENCOUNTER — ROUTINE PRENATAL (OUTPATIENT)
Age: 39
End: 2024-11-25
Payer: MEDICAID

## 2024-11-25 VITALS
RESPIRATION RATE: 20 BRPM | HEIGHT: 61 IN | SYSTOLIC BLOOD PRESSURE: 96 MMHG | DIASTOLIC BLOOD PRESSURE: 60 MMHG | OXYGEN SATURATION: 97 % | WEIGHT: 140.4 LBS | BODY MASS INDEX: 26.51 KG/M2 | HEART RATE: 87 BPM | TEMPERATURE: 98.2 F

## 2024-11-25 DIAGNOSIS — O09.522 AMA (ADVANCED MATERNAL AGE) MULTIGRAVIDA 35+, SECOND TRIMESTER: ICD-10-CM

## 2024-11-25 DIAGNOSIS — O09.93 PREGNANCY, SUPERVISION, HIGH-RISK, THIRD TRIMESTER: Primary | ICD-10-CM

## 2024-11-25 DIAGNOSIS — Z3A.34 34 WEEKS GESTATION OF PREGNANCY: ICD-10-CM

## 2024-11-25 PROCEDURE — PBSHW TDAP, BOOSTRIX, (AGE 10 YRS+), IM

## 2024-11-25 PROCEDURE — 90715 TDAP VACCINE 7 YRS/> IM: CPT

## 2024-11-25 PROCEDURE — 0502F SUBSEQUENT PRENATAL CARE: CPT

## 2024-11-25 NOTE — PROGRESS NOTES
[facility]   Former[facility]    Return OB Visit     Subjective:   Agustin Contreras 39 y.o.   SCOUT: 1/3/2025, by Ultrasound  GA:  34w3d.     Having round ligament pain.     Objective:   Physical Exam:  Blood pressure 96/60, pulse 87, temperature 98.2 °F (36.8 °C), temperature source Oral, resp. rate 20, height 1.549 m (5' 0.98\"), weight 63.7 kg (140 lb 6.4 oz), last menstrual period 04/10/2024, SpO2 97%.  Patient without distress       FHT:  140 FH: 34    Assessment/Plan:      at 34w3d wks by 11 week US.    IUP: Rh +  GTT pass, Hgb 11.3  Tdap , declines flu  anatomy normal, EFW 69th%, AC 79th% on 10/29  Hep B NI: s/p 2 doses, 3rd due 3/2025  AMA: ASA 81 mg daily, NIPT low risk, repeat growth 12/3 w/ MFM  Round ligament pain: Recommended support band. Counseled on L&D precautions.     Today: Tdap  Next visit: GBS     Diagnosis Orders   1. Pregnancy, supervision, high-risk, third trimester        2. 34 weeks gestation of pregnancy  Tdap, BOOSTRIX, (age 10 yrs+), IM      3. AMA (advanced maternal age) multigravida 35+, second trimester          Patient's care discussed with Dr. Steele.    Arely Velazquez DO  Family Medicine Resident

## 2024-11-25 NOTE — PROGRESS NOTES
I reviewed with the resident the medical history and the resident's findings on the physical examination.  I discussed with the resident the patient's diagnosis and concur with the plan.      39 y.o.  at 34w3d by 11w scan   IUP: RH, passed GTT, repeat CBC okay, +tdap, anatomy normal, EFW 69th% on 10/29  Hep B NI: s/p 2 doses, 3rd due 3/2025  AMA: ASA, 3rd tri testing with MFM

## 2024-11-25 NOTE — PROGRESS NOTES
Chief Complaint   Patient presents with    Routine Prenatal Visit    Fredis Powell #[id number]     Patient identified by name and . Patient is a  at 34w3d.    Taking prenatal vitamins: Yes  Leakage of fluid: No  Vaginal bleeding: No  Feeling baby move if over 20 weeks: Yes  Contractions: No  Pain: No    Vitals:    24 1038   BP: 96/60   Site: Right Upper Arm   Position: Sitting   Cuff Size: Medium Adult   Pulse: 87   Resp: 20   Temp: 98.2 °F (36.8 °C)   TempSrc: Oral   SpO2: 97%   Weight: 63.7 kg (140 lb 6.4 oz)   Height: 1.549 m (5' 0.98\")        Immunization History   Administered Date(s) Administered    Hep B, ENGERIX-B, (age 20y+), IM, 1mL 2024, 2024    Influenza, FLUARIX, FLULAVAL, FLUZONE (age 6 mo+) and AFLURIA, (age 3 y+), Quadv PF, 0.5mL 2019, 2019    TDaP, ADACEL (age 10y-64y), BOOSTRIX (age 10y+), IM, 0.5mL 2019       1. Have you been to the ER, urgent care clinic since your last visit?  Hospitalized since your last visit?No    2. Have you seen or consulted any other health care providers outside of the [facility] since your last visit?  Include any pap smears or colon screening. No    Patient informed about upcoming appointments and given handout with date/time/location.

## 2024-12-09 ENCOUNTER — ROUTINE PRENATAL (OUTPATIENT)
Age: 39
End: 2024-12-09

## 2024-12-09 VITALS
RESPIRATION RATE: 14 BRPM | HEART RATE: 72 BPM | SYSTOLIC BLOOD PRESSURE: 101 MMHG | OXYGEN SATURATION: 96 % | TEMPERATURE: 98.7 F | BODY MASS INDEX: 27.19 KG/M2 | DIASTOLIC BLOOD PRESSURE: 58 MMHG | WEIGHT: 144 LBS | HEIGHT: 61 IN

## 2024-12-09 DIAGNOSIS — O09.93 PREGNANCY, SUPERVISION, HIGH-RISK, THIRD TRIMESTER: ICD-10-CM

## 2024-12-09 DIAGNOSIS — Z3A.36 36 WEEKS GESTATION OF PREGNANCY: Primary | ICD-10-CM

## 2024-12-09 DIAGNOSIS — O09.522 AMA (ADVANCED MATERNAL AGE) MULTIGRAVIDA 35+, SECOND TRIMESTER: ICD-10-CM

## 2024-12-09 PROCEDURE — 0502F SUBSEQUENT PRENATAL CARE: CPT

## 2024-12-09 NOTE — PROGRESS NOTES
Session Code 70348 / : #[id number]   Department Code: [phone]   Chief Complaint   Patient presents with    Routine Prenatal Visit     Patient has a question about how active baby is currently. She wants to know if its normal for him being so active right now.     Seems to come and go every 15 minutes only at night.    Patient identified with 2 patient identifiers (name and D.O.B)    Patient is a  at 36w3d    Leakage of Fluid: NO  Vaginal Bleeding: NO  Fetal Movement if > 20 weeks: YES  Prenatal vitamins: YES  Having Contractions: NO  Pain: Yes in her stomach describes it as a pressure that is on and off.    LMP 04/10/2024 (Approximate)     Immunization History   Administered Date(s) Administered    Hep B, ENGERIX-B, (age 20y+), IM, 1mL 2024, 2024    Influenza, FLUARIX, FLULAVAL, FLUZONE (age 6 mo+) and AFLURIA, (age 3 y+), Quadv PF, 0.5mL 2019, 2019    TDaP, ADACEL (age 10y-64y), BOOSTRIX (age 10y+), IM, 0.5mL 2019, 2024       1. Have you been to the ER, urgent care clinic since your last visit?  Hospitalized since your last visit?NO    2. Have you seen or consulted any other health care providers outside of the [facility] since your last visit?  Include any pap smears or colon screening. NO

## 2024-12-09 NOTE — PROGRESS NOTES
[id number] [id number]   Office [phone], Fax [phone]    Return OB Visit       Subjective:   Agustin Contreras 39 y.o.   SCOUT: 1/3/2025, by Ultrasound  GA:  36w3d.     LOF: n  Vaginal bleeding: n  Fetal movement (after 20 weeks): y  Contractions: n  PNV: y  ASA: y      Objective:   Physical Exam:  Blood pressure (!) 101/58, pulse 72, temperature 98.7 °F (37.1 °C), resp. rate 14, height 1.549 m (5' 1\"), weight 65.3 kg (144 lb), last menstrual period 04/10/2024, SpO2 96%.        Physical Exam:  GENERAL APPEARANCE: alert, well appearing, in no apparent distress  ABDOMEN: gravid,   FHT: 150  Fundal Height: 36  PSYCH: normal mood and affect      Assessment      Diagnosis Orders   1. 36 weeks gestation of pregnancy  Culture, Strep B Screen, Vaginal/Rectal      2. AMA (advanced maternal age) multigravida 35+, second trimester        3. Pregnancy, supervision, high-risk, third trimester          Plan   39 y.o.  here for SIUP at 36w3d wks by 11 wk us    SIUP @ 36w3d  IOB labs: B POSITIVE,  Ab neg, HIV/RPR/HCV/HBV/GC/CT negative, Rubella/VZV immune, CBC 13.5> 11.3, Hgb fract wnl, UA/Ucx negative, pap NILM HPV neg, passed GTT,   Genetic Screening: NIPT ok  Vaccinations: S/p TDAP, declined flu   Ultrasounds: 30w4d EFW 69% AC 79% anatomy wnl  Growth on   Medications: Currently taking ASA, PNV   Next appointment: FU in 1 week  US done today in office showed presentation cephalic.   Today: GBS    PREGNANCY CONCERNS   AMA: counseled on ASA   - continue ASA 81mg qD  - Growth on      Hepatitis B nonimmune: noted on initial labs  S/p HBV on 24, 24 , third dose ~3/ 2025    Round ligament pain: Recommended support band. Counseled on L&D precautions.     Elmer Pritchett: intermittent pelvic pressure. Discussed labor precautions.     ---------------------------------------  Continuity Provider: HROB  Pain mgmt. in labor: undecided  Feeding: Formula, maybe BF  Circ: n/a - la

## 2024-12-09 NOTE — PROGRESS NOTES
I reviewed with the resident the medical history and the resident's findings on the physical examination.  I discussed with the resident the patient's diagnosis and concur with the plan.    39 y.o.  at 36w3d by 11w scan     IUP: Rh, passed GTT, repeat CBC okay, +tdap, anatomy normal, EFW 69th% on 10/29  GBS today   Hep B NI: s/p 2 doses, 3rd due 3/2025  AMA: ASA, 3rd tri testing with MFM scheduled     Desires postpartum salpingectomy - referral form faxed  to [facility]  Estimated Date of Delivery: 1/3/25

## 2024-12-12 LAB
BACTERIA SPEC CULT: NORMAL
SERVICE CMNT-IMP: NORMAL

## 2024-12-16 ENCOUNTER — TELEPHONE (OUTPATIENT)
Age: 39
End: 2024-12-16

## 2024-12-24 ENCOUNTER — HOSPITAL ENCOUNTER (INPATIENT)
Facility: HOSPITAL | Age: 39
LOS: 1 days | Discharge: HOME | DRG: 560 | End: 2024-12-25
Attending: FAMILY MEDICINE | Admitting: STUDENT IN AN ORGANIZED HEALTH CARE EDUCATION/TRAINING PROGRAM
Payer: MEDICAID

## 2024-12-24 ENCOUNTER — ANESTHESIA (OUTPATIENT)
Facility: HOSPITAL | Age: 39
DRG: 560 | End: 2024-12-24
Payer: MEDICAID

## 2024-12-24 ENCOUNTER — ANESTHESIA EVENT (OUTPATIENT)
Facility: HOSPITAL | Age: 39
DRG: 560 | End: 2024-12-24
Payer: MEDICAID

## 2024-12-24 PROBLEM — Z3A.38 38 WEEKS GESTATION OF PREGNANCY: Status: ACTIVE | Noted: 2024-12-24

## 2024-12-24 LAB
ABO + RH BLD: NORMAL
BASOPHILS # BLD: 0 K/UL (ref 0–0.1)
BASOPHILS NFR BLD: 0 % (ref 0–1)
BLOOD GROUP ANTIBODIES SERPL: NORMAL
DIFFERENTIAL METHOD BLD: ABNORMAL
EOSINOPHIL # BLD: 0 K/UL (ref 0–0.4)
EOSINOPHIL NFR BLD: 0 % (ref 0–7)
ERYTHROCYTE [DISTWIDTH] IN BLOOD BY AUTOMATED COUNT: 12.4 % (ref 11.5–14.5)
HCT VFR BLD AUTO: 39 % (ref 35–47)
HGB BLD-MCNC: 13.6 G/DL (ref 11.5–16)
IMM GRANULOCYTES # BLD AUTO: 0.1 K/UL (ref 0–0.04)
IMM GRANULOCYTES NFR BLD AUTO: 1 % (ref 0–0.5)
LYMPHOCYTES # BLD: 2.1 K/UL (ref 0.8–3.5)
LYMPHOCYTES NFR BLD: 20 % (ref 12–49)
MCH RBC QN AUTO: 31.4 PG (ref 26–34)
MCHC RBC AUTO-ENTMCNC: 34.9 G/DL (ref 30–36.5)
MCV RBC AUTO: 90.1 FL (ref 80–99)
MONOCYTES # BLD: 0.7 K/UL (ref 0–1)
MONOCYTES NFR BLD: 6 % (ref 5–13)
NEUTS SEG # BLD: 8 K/UL (ref 1.8–8)
NEUTS SEG NFR BLD: 73 % (ref 32–75)
NRBC # BLD: 0 K/UL (ref 0–0.01)
NRBC BLD-RTO: 0 PER 100 WBC
PLATELET # BLD AUTO: 202 K/UL (ref 150–400)
PMV BLD AUTO: 10.8 FL (ref 8.9–12.9)
RBC # BLD AUTO: 4.33 M/UL (ref 3.8–5.2)
SPECIMEN EXP DATE BLD: NORMAL
WBC # BLD AUTO: 10.9 K/UL (ref 3.6–11)

## 2024-12-24 PROCEDURE — 6360000002 HC RX W HCPCS: Performed by: NURSE ANESTHETIST, CERTIFIED REGISTERED

## 2024-12-24 PROCEDURE — 6370000000 HC RX 637 (ALT 250 FOR IP)

## 2024-12-24 PROCEDURE — 6370000000 HC RX 637 (ALT 250 FOR IP): Performed by: STUDENT IN AN ORGANIZED HEALTH CARE EDUCATION/TRAINING PROGRAM

## 2024-12-24 PROCEDURE — 86850 RBC ANTIBODY SCREEN: CPT

## 2024-12-24 PROCEDURE — 0KQM0ZZ REPAIR PERINEUM MUSCLE, OPEN APPROACH: ICD-10-PCS

## 2024-12-24 PROCEDURE — 36415 COLL VENOUS BLD VENIPUNCTURE: CPT

## 2024-12-24 PROCEDURE — 59400 OBSTETRICAL CARE: CPT | Performed by: STUDENT IN AN ORGANIZED HEALTH CARE EDUCATION/TRAINING PROGRAM

## 2024-12-24 PROCEDURE — 3700000025 EPIDURAL BLOCK: Performed by: ANESTHESIOLOGY

## 2024-12-24 PROCEDURE — 00HU33Z INSERTION OF INFUSION DEVICE INTO SPINAL CANAL, PERCUTANEOUS APPROACH: ICD-10-PCS | Performed by: ANESTHESIOLOGY

## 2024-12-24 PROCEDURE — 7220000101 HC DELIVERY VAGINAL/SINGLE: Performed by: STUDENT IN AN ORGANIZED HEALTH CARE EDUCATION/TRAINING PROGRAM

## 2024-12-24 PROCEDURE — 51701 INSERT BLADDER CATHETER: CPT

## 2024-12-24 PROCEDURE — 6360000002 HC RX W HCPCS

## 2024-12-24 PROCEDURE — 7210000100 HC LABOR FEE PER 1 HR: Performed by: STUDENT IN AN ORGANIZED HEALTH CARE EDUCATION/TRAINING PROGRAM

## 2024-12-24 PROCEDURE — 1120000000 HC RM PRIVATE OB

## 2024-12-24 PROCEDURE — 85025 COMPLETE CBC W/AUTO DIFF WBC: CPT

## 2024-12-24 PROCEDURE — 94761 N-INVAS EAR/PLS OXIMETRY MLT: CPT

## 2024-12-24 PROCEDURE — 86780 TREPONEMA PALLIDUM: CPT

## 2024-12-24 PROCEDURE — 86901 BLOOD TYPING SEROLOGIC RH(D): CPT

## 2024-12-24 PROCEDURE — 2580000003 HC RX 258

## 2024-12-24 PROCEDURE — 3E033VJ INTRODUCTION OF OTHER HORMONE INTO PERIPHERAL VEIN, PERCUTANEOUS APPROACH: ICD-10-PCS

## 2024-12-24 PROCEDURE — 3700000156 HC EPIDURAL ANESTHESIA: Performed by: NURSE ANESTHETIST, CERTIFIED REGISTERED

## 2024-12-24 PROCEDURE — 99213 OFFICE O/P EST LOW 20 MIN: CPT

## 2024-12-24 PROCEDURE — 4A1HXCZ MONITORING OF PRODUCTS OF CONCEPTION, CARDIAC RATE, EXTERNAL APPROACH: ICD-10-PCS

## 2024-12-24 PROCEDURE — 6360000002 HC RX W HCPCS: Performed by: ANESTHESIOLOGY

## 2024-12-24 PROCEDURE — 86900 BLOOD TYPING SEROLOGIC ABO: CPT

## 2024-12-24 RX ORDER — SODIUM CHLORIDE 0.9 % (FLUSH) 0.9 %
5-40 SYRINGE (ML) INJECTION PRN
Status: DISCONTINUED | OUTPATIENT
Start: 2024-12-24 | End: 2024-12-25 | Stop reason: HOSPADM

## 2024-12-24 RX ORDER — TRANEXAMIC ACID 10 MG/ML
1000 INJECTION, SOLUTION INTRAVENOUS
Status: ACTIVE | OUTPATIENT
Start: 2024-12-24 | End: 2024-12-25

## 2024-12-24 RX ORDER — SODIUM CHLORIDE, SODIUM LACTATE, POTASSIUM CHLORIDE, AND CALCIUM CHLORIDE .6; .31; .03; .02 G/100ML; G/100ML; G/100ML; G/100ML
500 INJECTION, SOLUTION INTRAVENOUS PRN
Status: DISCONTINUED | OUTPATIENT
Start: 2024-12-24 | End: 2024-12-24

## 2024-12-24 RX ORDER — IBUPROFEN 800 MG/1
800 TABLET, FILM COATED ORAL EVERY 8 HOURS SCHEDULED
Status: DISCONTINUED | OUTPATIENT
Start: 2024-12-24 | End: 2024-12-24

## 2024-12-24 RX ORDER — MAGNESIUM CARB/ALUMINUM HYDROX 105-160MG
60 TABLET,CHEWABLE ORAL DAILY PRN
Status: DISCONTINUED | OUTPATIENT
Start: 2024-12-24 | End: 2024-12-25 | Stop reason: HOSPADM

## 2024-12-24 RX ORDER — SODIUM CHLORIDE 0.9 % (FLUSH) 0.9 %
5-40 SYRINGE (ML) INJECTION EVERY 12 HOURS SCHEDULED
Status: DISCONTINUED | OUTPATIENT
Start: 2024-12-24 | End: 2024-12-25 | Stop reason: HOSPADM

## 2024-12-24 RX ORDER — ONDANSETRON 2 MG/ML
4 INJECTION INTRAMUSCULAR; INTRAVENOUS EVERY 6 HOURS PRN
Status: DISCONTINUED | OUTPATIENT
Start: 2024-12-24 | End: 2024-12-25 | Stop reason: HOSPADM

## 2024-12-24 RX ORDER — ACETAMINOPHEN 325 MG/1
650 TABLET ORAL EVERY 4 HOURS PRN
Status: DISCONTINUED | OUTPATIENT
Start: 2024-12-24 | End: 2024-12-25 | Stop reason: HOSPADM

## 2024-12-24 RX ORDER — SODIUM CHLORIDE, SODIUM LACTATE, POTASSIUM CHLORIDE, CALCIUM CHLORIDE 600; 310; 30; 20 MG/100ML; MG/100ML; MG/100ML; MG/100ML
INJECTION, SOLUTION INTRAVENOUS CONTINUOUS
Status: DISCONTINUED | OUTPATIENT
Start: 2024-12-24 | End: 2024-12-24

## 2024-12-24 RX ORDER — METHYLERGONOVINE MALEATE 0.2 MG/ML
200 INJECTION INTRAVENOUS PRN
Status: DISCONTINUED | OUTPATIENT
Start: 2024-12-24 | End: 2024-12-25 | Stop reason: HOSPADM

## 2024-12-24 RX ORDER — LIDOCAINE HYDROCHLORIDE 20 MG/ML
INJECTION, SOLUTION EPIDURAL; INFILTRATION; INTRACAUDAL; PERINEURAL
Status: DISCONTINUED | OUTPATIENT
Start: 2024-12-24 | End: 2024-12-26 | Stop reason: SDUPTHER

## 2024-12-24 RX ORDER — SODIUM CHLORIDE 9 MG/ML
INJECTION, SOLUTION INTRAVENOUS PRN
Status: DISCONTINUED | OUTPATIENT
Start: 2024-12-24 | End: 2024-12-25 | Stop reason: HOSPADM

## 2024-12-24 RX ORDER — FENTANYL CITRATE 50 UG/ML
50 INJECTION, SOLUTION INTRAMUSCULAR; INTRAVENOUS
Status: COMPLETED | OUTPATIENT
Start: 2024-12-24 | End: 2024-12-24

## 2024-12-24 RX ORDER — ONDANSETRON 2 MG/ML
4 INJECTION INTRAMUSCULAR; INTRAVENOUS EVERY 6 HOURS PRN
Status: DISCONTINUED | OUTPATIENT
Start: 2024-12-24 | End: 2024-12-24

## 2024-12-24 RX ORDER — LIDOCAINE HYDROCHLORIDE 10 MG/ML
INJECTION, SOLUTION INFILTRATION; PERINEURAL
Status: DISCONTINUED | OUTPATIENT
Start: 2024-12-24 | End: 2024-12-26 | Stop reason: SDUPTHER

## 2024-12-24 RX ORDER — TRANEXAMIC ACID 10 MG/ML
INJECTION, SOLUTION INTRAVENOUS
Status: DISPENSED
Start: 2024-12-24 | End: 2024-12-24

## 2024-12-24 RX ORDER — MISOPROSTOL 200 UG/1
800 TABLET ORAL PRN
Status: DISCONTINUED | OUTPATIENT
Start: 2024-12-24 | End: 2024-12-25 | Stop reason: HOSPADM

## 2024-12-24 RX ORDER — BUPIVACAINE HYDROCHLORIDE 2.5 MG/ML
INJECTION, SOLUTION EPIDURAL; INFILTRATION; INTRACAUDAL
Status: DISCONTINUED | OUTPATIENT
Start: 2024-12-24 | End: 2024-12-26 | Stop reason: SDUPTHER

## 2024-12-24 RX ORDER — ONDANSETRON 4 MG/1
4 TABLET, ORALLY DISINTEGRATING ORAL EVERY 6 HOURS PRN
Status: DISCONTINUED | OUTPATIENT
Start: 2024-12-24 | End: 2024-12-25 | Stop reason: HOSPADM

## 2024-12-24 RX ORDER — SODIUM CHLORIDE 0.9 % (FLUSH) 0.9 %
5-40 SYRINGE (ML) INJECTION EVERY 12 HOURS SCHEDULED
Status: DISCONTINUED | OUTPATIENT
Start: 2024-12-24 | End: 2024-12-24

## 2024-12-24 RX ORDER — DOCUSATE SODIUM 100 MG/1
100 CAPSULE, LIQUID FILLED ORAL 2 TIMES DAILY
Status: DISCONTINUED | OUTPATIENT
Start: 2024-12-24 | End: 2024-12-25 | Stop reason: HOSPADM

## 2024-12-24 RX ORDER — MISOPROSTOL 200 UG/1
400 TABLET ORAL PRN
Status: DISCONTINUED | OUTPATIENT
Start: 2024-12-24 | End: 2024-12-25 | Stop reason: HOSPADM

## 2024-12-24 RX ORDER — IBUPROFEN 800 MG/1
800 TABLET, FILM COATED ORAL EVERY 8 HOURS SCHEDULED
Status: DISCONTINUED | OUTPATIENT
Start: 2024-12-24 | End: 2024-12-25 | Stop reason: HOSPADM

## 2024-12-24 RX ORDER — ACETAMINOPHEN 500 MG
1000 TABLET ORAL EVERY 8 HOURS SCHEDULED
Status: DISCONTINUED | OUTPATIENT
Start: 2024-12-24 | End: 2024-12-24

## 2024-12-24 RX ORDER — SODIUM CHLORIDE 0.9 % (FLUSH) 0.9 %
5-40 SYRINGE (ML) INJECTION PRN
Status: DISCONTINUED | OUTPATIENT
Start: 2024-12-24 | End: 2024-12-24

## 2024-12-24 RX ORDER — ACETAMINOPHEN 500 MG
1000 TABLET ORAL EVERY 8 HOURS SCHEDULED
Status: DISCONTINUED | OUTPATIENT
Start: 2024-12-24 | End: 2024-12-25 | Stop reason: HOSPADM

## 2024-12-24 RX ORDER — SODIUM CHLORIDE 9 MG/ML
25 INJECTION, SOLUTION INTRAVENOUS PRN
Status: DISCONTINUED | OUTPATIENT
Start: 2024-12-24 | End: 2024-12-24

## 2024-12-24 RX ORDER — NALOXONE HYDROCHLORIDE 0.4 MG/ML
INJECTION, SOLUTION INTRAMUSCULAR; INTRAVENOUS; SUBCUTANEOUS PRN
Status: DISCONTINUED | OUTPATIENT
Start: 2024-12-24 | End: 2024-12-24

## 2024-12-24 RX ORDER — TERBUTALINE SULFATE 1 MG/ML
0.25 INJECTION, SOLUTION SUBCUTANEOUS
Status: DISCONTINUED | OUTPATIENT
Start: 2024-12-24 | End: 2024-12-24

## 2024-12-24 RX ORDER — OXYCODONE HYDROCHLORIDE 5 MG/1
5 TABLET ORAL EVERY 4 HOURS PRN
Status: COMPLETED | OUTPATIENT
Start: 2024-12-24 | End: 2024-12-24

## 2024-12-24 RX ORDER — ONDANSETRON 4 MG/1
4 TABLET, ORALLY DISINTEGRATING ORAL EVERY 6 HOURS PRN
Status: DISCONTINUED | OUTPATIENT
Start: 2024-12-24 | End: 2024-12-24

## 2024-12-24 RX ORDER — BUPIVACAINE HYDROCHLORIDE 2.5 MG/ML
INJECTION, SOLUTION EPIDURAL; INFILTRATION; INTRACAUDAL
Status: DISCONTINUED | OUTPATIENT
Start: 2024-12-24 | End: 2024-12-24

## 2024-12-24 RX ORDER — LIDOCAINE HCL/EPINEPHRINE/PF 2%-1:200K
VIAL (ML) INJECTION
Status: DISCONTINUED | OUTPATIENT
Start: 2024-12-24 | End: 2024-12-26 | Stop reason: SDUPTHER

## 2024-12-24 RX ORDER — FENTANYL/BUPIVACAINE/NS/PF 2-1250MCG
10 PLASTIC BAG, INJECTION (ML) INJECTION CONTINUOUS
Status: DISCONTINUED | OUTPATIENT
Start: 2024-12-24 | End: 2024-12-24

## 2024-12-24 RX ORDER — CARBOPROST TROMETHAMINE 250 UG/ML
250 INJECTION, SOLUTION INTRAMUSCULAR PRN
Status: DISCONTINUED | OUTPATIENT
Start: 2024-12-24 | End: 2024-12-25 | Stop reason: HOSPADM

## 2024-12-24 RX ORDER — FENTANYL CITRATE 50 UG/ML
INJECTION, SOLUTION INTRAMUSCULAR; INTRAVENOUS
Status: COMPLETED
Start: 2024-12-24 | End: 2024-12-24

## 2024-12-24 RX ADMIN — LIDOCAINE HYDROCHLORIDE,EPINEPHRINE BITARTRATE 3 ML: 20; .005 INJECTION, SOLUTION EPIDURAL; INFILTRATION; INTRACAUDAL; PERINEURAL at 07:15

## 2024-12-24 RX ADMIN — Medication 500 ML: at 08:32

## 2024-12-24 RX ADMIN — LIDOCAINE HYDROCHLORIDE 5 ML: 20 INJECTION, SOLUTION EPIDURAL; INFILTRATION; INTRACAUDAL at 08:44

## 2024-12-24 RX ADMIN — IBUPROFEN 800 MG: 800 TABLET, FILM COATED ORAL at 20:53

## 2024-12-24 RX ADMIN — OXYCODONE 5 MG: 5 TABLET ORAL at 20:57

## 2024-12-24 RX ADMIN — LIDOCAINE HYDROCHLORIDE 30 MG: 10 INJECTION, SOLUTION INFILTRATION; PERINEURAL at 07:13

## 2024-12-24 RX ADMIN — ACETAMINOPHEN 1000 MG: 500 TABLET ORAL at 23:27

## 2024-12-24 RX ADMIN — OXYCODONE 5 MG: 5 TABLET ORAL at 12:16

## 2024-12-24 RX ADMIN — FENTANYL CITRATE 50 MCG: 50 INJECTION, SOLUTION INTRAMUSCULAR; INTRAVENOUS at 08:37

## 2024-12-24 RX ADMIN — BUPIVACAINE HYDROCHLORIDE 12 ML: 2.5 INJECTION, SOLUTION EPIDURAL; INFILTRATION; INTRACAUDAL; PERINEURAL at 07:17

## 2024-12-24 RX ADMIN — FENTANYL CITRATE 50 MCG: 50 INJECTION INTRAMUSCULAR; INTRAVENOUS at 08:37

## 2024-12-24 RX ADMIN — LIDOCAINE HYDROCHLORIDE 30 ML: 10 INJECTION, SOLUTION EPIDURAL; INFILTRATION; INTRACAUDAL; PERINEURAL at 08:42

## 2024-12-24 RX ADMIN — ACETAMINOPHEN 1000 MG: 500 TABLET ORAL at 10:55

## 2024-12-24 RX ADMIN — SODIUM CHLORIDE, POTASSIUM CHLORIDE, SODIUM LACTATE AND CALCIUM CHLORIDE: 600; 310; 30; 20 INJECTION, SOLUTION INTRAVENOUS at 07:44

## 2024-12-24 RX ADMIN — DOCUSATE SODIUM 100 MG: 100 CAPSULE, LIQUID FILLED ORAL at 20:58

## 2024-12-24 ASSESSMENT — PAIN - FUNCTIONAL ASSESSMENT
PAIN_FUNCTIONAL_ASSESSMENT: ACTIVITIES ARE NOT PREVENTED

## 2024-12-24 ASSESSMENT — PAIN DESCRIPTION - LOCATION
LOCATION: ABDOMEN;BACK
LOCATION: ABDOMEN
LOCATION: ABDOMEN
LOCATION: PERINEUM
LOCATION: PERINEUM
LOCATION: ABDOMEN

## 2024-12-24 ASSESSMENT — PAIN SCALES - GENERAL
PAINLEVEL_OUTOF10: 4
PAINLEVEL_OUTOF10: 8
PAINLEVEL_OUTOF10: 10
PAINLEVEL_OUTOF10: 7
PAINLEVEL_OUTOF10: 10
PAINLEVEL_OUTOF10: 7

## 2024-12-24 ASSESSMENT — PAIN DESCRIPTION - DESCRIPTORS
DESCRIPTORS: CRAMPING;SORE
DESCRIPTORS: CRAMPING

## 2024-12-24 ASSESSMENT — PAIN DESCRIPTION - ORIENTATION
ORIENTATION: LOWER

## 2024-12-24 NOTE — PROGRESS NOTES
0705: Bedside and Verbal shift change report given to THIAGO Carbone (oncoming nurse) by GENARO Osborne (offgoing nurse). Report included the following information Nurse Handoff Report, Adult Overview, and Recent Results. Pt sitting for epidural at this time. Anesthesia and THIAGO Moeller CNM at bedside at this time   0705: Time out  0715: Test dose    0725: Pt states she needs to push    0730: [facility] at [facility]    0732: SVE performed by Dr. Bobo. Pt is complete    0734: Patient actively pushing.  RN remains in continuous attendance at the bedside.  Assessment & evaluation of fetal heart rate ongoing via continuous EFM.    0753: Dr. Lyons at bedside.    0827: RN remained at bedside throughout pushing.  EFM continuously assessed.  Vaginal delivery of viable infant.    1025: Pt assisted to bathroom via luisa steady. Reports no urge to void.     1030: Straight cath inserted. 400ml of clear urine drained    1215: Pt assisted to bathroom. 200mL of clear urine voided    1240: TRANSFER - OUT REPORT:    Verbal report given to SAVITA Villar on Agustin Contreras  being transferred to THIAGO Carbone RN for routine progression of patient care       Report consisted of patient's Situation, Background, Assessment and   Recommendations(SBAR).     Information from the following report(s) Nurse Handoff Report, Adult Overview, Intake/Output, MAR, Recent Results, and Med Rec Status was reviewed with the receiving nurse.           Lines:   Peripheral IV 12/24/24 Left;Anterior Forearm (Active)   Site Assessment Clean, dry & intact 12/24/24 0832   Line Status Infusing 12/24/24 0832   Line Care Connections checked and tightened 12/24/24 0710   Phlebitis Assessment No symptoms 12/24/24 0832   Infiltration Assessment 0 12/24/24 0832   Alcohol Cap Used Yes 12/24/24 0832   Dressing Status Clean, dry & intact 12/24/24 0832   Dressing Type Transparent 12/24/24 0832        Opportunity for questions and clarification was provided.      Patient transported

## 2024-12-24 NOTE — L&D DELIVERY NOTE
Ben, Female Agustin [[id number]]      Labor Events     Labor: No   Steroids: None  Cervical Ripening Date/Time:      Antibiotics Received during Labor: No  Rupture Date/Time:  24 04:30:00   Rupture Type: SROM  Fluid Color: Clear  Fluid Odor: None  Fluid Volume: Moderate  Induction: None  Augmentation: None  Labor Complications: None       Anesthesia    Method: Epidural       Labor Event Times      Labor onset date/time:        Dilation complete date/time:  24 07:32:00     Start pushing date/time:  2024 07:34:00   Decision date/time (emergent ):            Labor Length    2nd stage: 0h 55m  3rd stage: 0h 05m       Delivery Details      Delivery Date: 24 Delivery Time: 08:27:00   Delivery Type: Vaginal, Spontaneous              Mercer Presentation    Presentation: Vertex  Position: Left  _: Occiput  _: Anterior       Shoulder Dystocia    Shoulder Dystocia Present?: No       Assisted Delivery Details    Forceps Attempted?: No  Vacuum Extractor Attempted?: No                           Cord    Vessels: 3 Vessels  Complications: None  Delayed Cord Clamping?: Yes  Cord Clamped Date/Time: 2024 08:38:00  Cord Blood Disposition: Discard  Gases Sent?: No              Placenta    Date/Time: 2024 08:32:00  Removal: Expressed  Appearance: Intact  Disposition: Discarded       Lacerations    Episiotomy: None  Perineal Lacerations: 2nd  Other Lacerations: no non-perineal laceration  Number of Repair Packets: 1       Vaginal Counts    Initial Count Personnel: GENARO MCCONNELL RN  Initial Count Verified By: THIAGO RAE RN  Intial Sponge Count: Correct Intial Needles Count: Correct Intial Instruments Count: Correct   Final Sponges Count: Correct Final Needles  Count: Correct    Final Count Personnel: THIAGO RAE RN  Final Count Verified By: DR. PATEL  Accurate Final Count?: Yes       Blood Loss  Mother: Agustin Contreras N #[id number]     Start of Mother's Information      Delivery Blood Loss

## 2024-12-24 NOTE — ANESTHESIA PRE PROCEDURE
Department of Anesthesiology  Preprocedure Note       Name:  Agustin Contreras   Age:  39 y.o.  :  1985                                          MRN:  [id number]         Date:  2024      Surgeon: * No surgeons listed *    Procedure: * No procedures listed *    Medications prior to admission:   Prior to Admission medications    Medication Sig Start Date End Date Taking? Authorizing Provider   aspirin 81 MG EC tablet Take 1 tablet by mouth daily 24  Yes Dereje Sparks MD   Prenatal Vit-Fe Fumarate-FA (PRENATAL VITAMINS) 28-0.8 MG TABS Take 1 tablet by mouth daily 24  Gallito Penny MD       Current medications:    Current Facility-Administered Medications   Medication Dose Route Frequency Provider Last Rate Last Admin   • mineral oil liquid 60 mL  60 mL Topical Daily PRN Naina Moeller APRN - MELVIN       • tranexamic acid-NaCl 1000-0.7 MG/100ML-% IVPB premix            • oxytocin (PITOCIN) 30 units in 500 mL infusion Override Pull            • terbutaline (BRETHINE) injection 0.25 mg  0.25 mg SubCUTAneous Once PRN Betzy Jaramillo MD       • lactated ringers infusion   IntraVENous Continuous Betzy Jaramillo MD       • lactated ringers bolus 500 mL  500 mL IntraVENous PRN Betzy Jaramillo MD        Or   • lactated ringers bolus 500 mL  500 mL IntraVENous PRN Betzy Jaramillo MD       • sodium chloride flush 0.9 % injection 5-40 mL  5-40 mL IntraVENous 2 times per day Betzy Jaramillo MD       • sodium chloride flush 0.9 % injection 5-40 mL  5-40 mL IntraVENous PRN Betzy Jaramillo MD       • 0.9 % sodium chloride infusion  25 mL IntraVENous PRN Betzy Jaramillo MD       • ondansetron (ZOFRAN) injection 4 mg  4 mg IntraVENous Q6H PRN Betzy Jaramillo MD        Or   • ondansetron (ZOFRAN-ODT) disintegrating tablet 4 mg  4 mg Oral Q6H PRN Betzy Jaramillo MD       • oxytocin (PITOCIN) 30 units in 500 mL infusion  87.3 kal-units/min IntraVENous Continuous PRN Betzy Jaramillo MD

## 2024-12-24 NOTE — H&P
mouth daily 24  Gallito Penny MD        Review of Systems:  A comprehensive review of systems was negative except for that written in the History of Present Illness.     Objective:     Vitals:    Vitals:    24 0918 24 0923 24 0924 24 0928   BP:  (!) 114/59 (!) 123/56    Pulse: 71 64 64    Resp:       Temp:       TempSrc:       SpO2: 98% 100%  100%      Temp (24hrs), Av.6 °F (36.4 °C), Min:97.6 °F (36.4 °C), Max:97.6 °F (36.4 °C)    BP  Min: 100/58  Max: 156/89     Physical Exam:  Gen: AAO x 3  CV: RRR no m/r/g  Resp: CTAB  Abdomen: gravid but soft, diffusely tender with painful contractions  Ext: No edema  SVE: 9/100/+1, fetal head presenting part    Membranes:  Spontaneous Rupture of Membranes; Amniotic Fluid: clear fluid  Uterine Activity:  Frequency: Every 3 minutes   Duration: 45 seconds  Fetal Heart Rate:  Reactive  Baseline: 135 per minute  Variability: moderate  Accelerations: no  Decelerations: none       Lab/Data Review:  Recent Results (from the past 24 hour(s))   CBC with Auto Differential    Collection Time: 24  6:29 AM   Result Value Ref Range    WBC 10.9 3.6 - 11.0 K/uL    RBC 4.33 3.80 - 5.20 M/uL    Hemoglobin 13.6 11.5 - 16.0 g/dL    Hematocrit 39.0 35.0 - 47.0 %    MCV 90.1 80.0 - 99.0 FL    MCH 31.4 26.0 - 34.0 PG    MCHC 34.9 30.0 - 36.5 g/dL    RDW 12.4 11.5 - 14.5 %    Platelets 202 150 - 400 K/uL    MPV 10.8 8.9 - 12.9 FL    Nucleated RBCs 0.0 0  WBC    nRBC 0.00 0.00 - 0.01 K/uL    Neutrophils % 73 32 - 75 %    Lymphocytes % 20 12 - 49 %    Monocytes % 6 5 - 13 %    Eosinophils % 0 0 - 7 %    Basophils % 0 0 - 1 %    Immature Granulocytes % 1 (H) 0.0 - 0.5 %    Neutrophils Absolute 8.0 1.8 - 8.0 K/UL    Lymphocytes Absolute 2.1 0.8 - 3.5 K/UL    Monocytes Absolute 0.7 0.0 - 1.0 K/UL    Eosinophils Absolute 0.0 0.0 - 0.4 K/UL    Basophils Absolute 0.0 0.0 - 0.1 K/UL    Immature Granulocytes Absolute 0.1 (H) 0.00 - 0.04 K/UL    Differential

## 2024-12-24 NOTE — ANESTHESIA PROCEDURE NOTES
Epidural Block    Patient location during procedure: OB  Start time: 12/24/2024 7:11 AM  End time: 12/24/2024 7:20 AM  Reason for block: labor epidural  Staffing  Performed: resident/CRNA   Anesthesiologist: Cecilio Saez MD  Resident/CRNA: Winston Sow APRN - CRNA  Performed by: Winston Sow APRN - CRNA  Authorized by: Winston Sow APRN - CRNA    Epidural  Patient position: sitting  Prep: Betadine  Patient monitoring: cardiac monitor, continuous pulse ox and frequent blood pressure checks  Approach: midline  Location: L3-4  Injection technique: LENORE saline  Provider prep: mask and sterile gloves  Needle  Needle type: Tuohy   Needle gauge: 17 G  Needle insertion depth: 6 cm  Catheter type: side hole  Catheter size: 20 G  Catheter at skin depth: 10 cm  Test dose: negativeCatheter Secured: tegaderm and tape  Assessment  Hemodynamics: stable  Attempts: 1  Outcomes: uncomplicated and patient tolerated procedure well  Preanesthetic Checklist  Completed: patient identified, IV checked, site marked, risks and benefits discussed, surgical/procedural consents, equipment checked, pre-op evaluation, timeout performed, anesthesia consent given, oxygen available, monitors applied/VS acknowledged, fire risk safety assessment completed and verbalized and blood product R/B/A discussed and consented

## 2024-12-24 NOTE — PROGRESS NOTES
0621: Patient arrives onto unit with c/o contraction pain. Patient reports SROM of clear/moderate fluid, denies vaginal bleeding. Patient endorses +FM, is tearful, FOB at bedside.    0621: Patient placed on EFM by SAVITA Osborne.    0625: MELVIN Moeller at bedside. Plan to admit for labor. Labs drawn.    0627: Patient bolusing for epidural.

## 2024-12-25 VITALS
OXYGEN SATURATION: 100 % | SYSTOLIC BLOOD PRESSURE: 110 MMHG | DIASTOLIC BLOOD PRESSURE: 67 MMHG | HEART RATE: 81 BPM | TEMPERATURE: 97.9 F | RESPIRATION RATE: 16 BRPM

## 2024-12-25 PROCEDURE — 6370000000 HC RX 637 (ALT 250 FOR IP)

## 2024-12-25 PROCEDURE — 6370000000 HC RX 637 (ALT 250 FOR IP): Performed by: STUDENT IN AN ORGANIZED HEALTH CARE EDUCATION/TRAINING PROGRAM

## 2024-12-25 RX ORDER — PSEUDOEPHEDRINE HCL 30 MG
100 TABLET ORAL 2 TIMES DAILY
Qty: 20 CAPSULE | Refills: 0 | Status: SHIPPED | OUTPATIENT
Start: 2024-12-25

## 2024-12-25 RX ORDER — IBUPROFEN 800 MG/1
800 TABLET, FILM COATED ORAL EVERY 8 HOURS PRN
Qty: 30 TABLET | Refills: 0 | Status: SHIPPED | OUTPATIENT
Start: 2024-12-25

## 2024-12-25 RX ADMIN — DOCUSATE SODIUM 100 MG: 100 CAPSULE, LIQUID FILLED ORAL at 09:20

## 2024-12-25 RX ADMIN — ACETAMINOPHEN 1000 MG: 500 TABLET ORAL at 13:11

## 2024-12-25 RX ADMIN — IBUPROFEN 800 MG: 800 TABLET, FILM COATED ORAL at 13:11

## 2024-12-25 RX ADMIN — IBUPROFEN 800 MG: 800 TABLET, FILM COATED ORAL at 05:03

## 2024-12-25 ASSESSMENT — PAIN DESCRIPTION - DESCRIPTORS: DESCRIPTORS: ACHING;CRAMPING

## 2024-12-25 ASSESSMENT — PAIN SCALES - GENERAL
PAINLEVEL_OUTOF10: 0
PAINLEVEL_OUTOF10: 4

## 2024-12-25 ASSESSMENT — PAIN DESCRIPTION - ORIENTATION: ORIENTATION: MID;LOWER

## 2024-12-25 ASSESSMENT — PAIN DESCRIPTION - LOCATION: LOCATION: ABDOMEN

## 2024-12-25 ASSESSMENT — PAIN - FUNCTIONAL ASSESSMENT
PAIN_FUNCTIONAL_ASSESSMENT: ACTIVITIES ARE NOT PREVENTED
PAIN_FUNCTIONAL_ASSESSMENT: ACTIVITIES ARE NOT PREVENTED

## 2024-12-25 NOTE — DISCHARGE INSTRUCTIONS
Patient Discharge Instructions    Agusitn LONGORIA Ben / [id number] : 1985    Admitted 2024 Discharged: 2024     Por favor tenga angel documento presente en rand anne de seguimiento con rand médico primario.    Primary care provider:  [facility]    Discharging provider:  Betzy Jaramillo MD  - Family Medicine Resident  Freda Perez MD -  OBGYN attending          Diágnostico de Admisión:  Pregnancy [Z34.90]  38 weeks gestation of pregnancy [Z3A.38]      RECOMENDACIONES DE CUIDADO:   Continue Tratamiento:  - Por favor continue Motrin 800 mg, 1 tableta cada 8 horas por los próximos 2 días, luego 1 tableta cada 8 horas mientras sea necesario para el dolor.  - Por favor continue Colace 100 mg para el estreñimiento, tome 1 tableta dos veces al día hasta que pueda defercar regularmente sin necesidad del medicamento.  - Por favor comience Ferrous Sulfate 325 mg para la anemia, Sparkill 1 tableta  veces día con jugo de naranja.   - Por favor continue tomando katelynn vitaminas prenatales.      Pruebas de seguimiento que necesita: No    Resultados pendientes:   En el momento de rand de molly los resultados de las siguientes pruebas están pendiente:  no.   Por favor discuta estos resultados con rand proveedor primario en rand anne de seguimiento.     Importante que monitoreé los siguientes síntomas: dolor de pecho, falta de aire, fiebre, escalofríos, náusea, vómito, diarrea, cambios en rand estado mental, caídas, debilidad y sangrado.      DIETA/que comer:   Regular    ACTIVIDAD FÍSICA:   Instrucciones de Actividad Física    No levante nada que sea más pesado que rand bebé por las próximas 6 semanas.  No insertar nada por la vaginal por 6 semanas. Luego del parto por cesárea/ vaginal debe evitar tener relaciones sexuales por 6 semanas.Tendrá rand anne de seguimiento posparto a las 6 semanas. Puede manejar rand vehículo mientras no esté tomando Percocet ni ningún medicamento nárcotico (Motrin está leydi).       Cuidado de Herida:

## 2024-12-25 NOTE — DISCHARGE SUMMARY
Obstetrical Discharge Summary     Name: Agustin Contreras MRN: [id number]  SSN: [id number]    YOB: 1985  Age: 39 y.o.  Sex: female      Admit Date: 2024    Discharge Date: 2024     Admitting Physician: Gallito Penny MD     Attending Physician:  Freda Perez MD    Admission Diagnoses: Pregnancy [Z34.90]  38 weeks gestation of pregnancy [Z3A.38]    Discharge Diagnoses:   Information for the patient's :  Negin Contreras [[id number]]   @[id number]@     Additional Diagnoses:  No components found for: \"OBEXTABORH\", \"OBEXTABSCRN\", \"OBEXTRUBELLA\", \"OBEXTGRBS\"    Immunization(s):   Immunization History   Administered Date(s) Administered    Hep B, ENGERIX-B, (age 20y+), IM, 1mL 2024, 2024    Influenza, FLUARIX, FLULAVAL, FLUZONE (age 6 mo+) and AFLURIA, (age 3 y+), Quadv PF, 0.5mL 2019, 2019    TDaP, ADACEL (age 10y-64y), BOOSTRIX (age 10y+), IM, 0.5mL 2019, 2024        Hospital Course:   Patient is a 39 y.o.  s/p  at 38 weeks 4 days.  Presented for  Active Labor.  Pregnancy complicated by AMA, HBNI, SARAI. Labor was uncomplicated.  Delivered TLFI by  without complications.  Normal hospital course following the delivery.  On day of discharge patient reported minimal lochia, well controlled pain, and no other complaints.  Discharged with pain regimen and bowel regimen.  Advised to continue prenatal vitamins, and iron.  Follow up with Dr. Jaramillo in 6 weeks. EPDS 1.    Follow up test at discharge: CBC  Condition at Discharge:  Stable  Disposition: Discharge to Home    Physical exam:  /67   Pulse 81   Temp 97.9 °F (36.6 °C) (Oral)   Resp 16   LMP 04/10/2024 (Approximate)   SpO2 100%   Breastfeeding Unknown     Exam:  Patient without distress.               CTAB, no w/r/r/c               RRR, + S1 and S2, no m/r/g               Abdomen soft, fundus firm at level of umbilicus, non tender               Lower extremities are negative for

## 2024-12-26 LAB — T PALLIDUM AB SER QL IA: NON REACTIVE

## 2024-12-26 NOTE — ANESTHESIA POSTPROCEDURE EVALUATION
Department of Anesthesiology  Postprocedure Note    Patient: Agustin Contreras  MRN: [id number]  YOB: 1985  Date of evaluation: 12/26/2024    Procedure Summary       Date: 12/24/24 Room / Location:     Anesthesia Start: 0711 Anesthesia Stop: 0827    Procedure: Labor Analgesia Diagnosis:     Scheduled Providers:  Responsible Provider: Cecilio Saez MD    Anesthesia Type: epidural ASA Status: 2            Anesthesia Type: No value filed.    Rina Phase I:      Rina Phase II:      Anesthesia Post Evaluation    Patient location during evaluation: PACU  Patient participation: complete - patient participated  Level of consciousness: awake  Airway patency: patent  Nausea & Vomiting: no vomiting and no nausea  Cardiovascular status: hemodynamically stable  Respiratory status: acceptable  Hydration status: stable  Pain management: adequate    No notable events documented.

## 2025-01-06 ENCOUNTER — TELEPHONE (OUTPATIENT)
Age: 40
End: 2025-01-06

## 2025-03-26 ENCOUNTER — OFFICE VISIT (OUTPATIENT)
Age: 40
End: 2025-03-26
Payer: MEDICAID

## 2025-03-26 VITALS
OXYGEN SATURATION: 99 % | HEART RATE: 78 BPM | HEIGHT: 61 IN | TEMPERATURE: 98.6 F | SYSTOLIC BLOOD PRESSURE: 114 MMHG | RESPIRATION RATE: 18 BRPM | BODY MASS INDEX: 25.19 KG/M2 | WEIGHT: 133.4 LBS | DIASTOLIC BLOOD PRESSURE: 73 MMHG

## 2025-03-26 DIAGNOSIS — N94.6 DYSMENORRHEA: ICD-10-CM

## 2025-03-26 DIAGNOSIS — R42 DIZZINESS: Primary | ICD-10-CM

## 2025-03-26 DIAGNOSIS — R10.2 PELVIC PAIN: ICD-10-CM

## 2025-03-26 PROBLEM — Z3A.38 38 WEEKS GESTATION OF PREGNANCY: Status: RESOLVED | Noted: 2024-12-24 | Resolved: 2025-03-26

## 2025-03-26 PROBLEM — Z34.90 PREGNANCY: Status: RESOLVED | Noted: 2019-07-14 | Resolved: 2025-03-26

## 2025-03-26 LAB — HEMOGLOBIN, POC: 15 G/DL

## 2025-03-26 PROCEDURE — PBSHW AMB POC HEMOGLOBIN (HGB)

## 2025-03-26 PROCEDURE — 85018 HEMOGLOBIN: CPT

## 2025-03-26 PROCEDURE — 0503F POSTPARTUM CARE VISIT: CPT

## 2025-03-26 RX ORDER — PNV NO.95/FERROUS FUM/FOLIC AC 28MG-0.8MG
1 TABLET ORAL DAILY
Qty: 90 TABLET | Refills: 5 | Status: SHIPPED | OUTPATIENT
Start: 2025-03-26

## 2025-03-26 RX ORDER — MEDROXYPROGESTERONE ACETATE 150 MG/ML
150 INJECTION, SUSPENSION INTRAMUSCULAR
Qty: 1 ML | Refills: 4 | Status: SHIPPED | OUTPATIENT
Start: 2025-03-26

## 2025-03-26 NOTE — PROGRESS NOTES
[facility] Medicine Residency     [address] [place] [address]   Office [phone], Fax [phone]    Subjective:     No chief complaint on file.    History provided by patient via  services      6 Week Post Partum Note    39 y.o. female , presenting for 6 week postpartum visit s/p  at 38w4d. Reports dizziness after bending over x 1 week. Also complaining of pelvic pain x 1 week, reports feeling like something is coming out of her vagina if she walks too long. Pain is worsened with walking. Denies vaginal bleeding, vaginal discharge, dysuria, urinary frequency.     Lochia: normal  Pain: new pelvic pain  Baby: doing well, has seen PCP  Sexual activity: none  Plan for contraception: pt wanted tubal ligation but doesn't have time to take off. Would like Depo shot.   Breast/bottle: formula  Support from FOB/family: from FOB  Symptoms of depression: none  [place] Depression Scale Score: 1    SocHx.   - Denies smoking, alcohol use, illcit drug use  - Sexually active: not currently  - Occupation: wants to, but was unable to d/t pelvic pain    Review of Systems   Genitourinary:  Positive for pelvic pain. Negative for dysuria, vaginal bleeding, vaginal discharge and vaginal pain.   Neurological:  Positive for dizziness.           Objective:     Vitals:    25 1047   BP: 109/66   Pulse: 78   Resp: 18   Temp: 98.6 °F (37 °C)   SpO2: 99%           Exam:  Patient without distress.    Abdomen soft, fundus firm at level of umbilicus, nontender.    Skin: low transverse  incision is clean dry and intact.  No sign of infection.                Lower extremities:  No edema. No palpable cords or tenderness.  Pelvic exam (chaperoned by SALENA Jean-Baptiste): no discharge. No lesions. No cystocele or rectocele. No mass.       Pertinent Labs/Studies:       Assessment and orders:     Assessment and Plan:    Agustin Contreras is a 39 y.o.  s/p

## 2025-03-26 NOTE — PROGRESS NOTES
Session Code: 30328    Name: Marguerite   ID #: [id number]        Pt is having some pelvic pain that started about a week ago. Pt states if she bends down, when she gets up she feels dizzy with some blurry vision.     Identified pt with two pt identifiers(name and ). Reviewed record in preparation for visit and have obtained necessary documentation.  Chief Complaint   Patient presents with    Postpartum Care        Vitals:    25 1047 25 1124 25 1125 25 1126   BP: 109/66 116/75 116/75 114/73   BP Site: Left Upper Arm Left Upper Arm Left Upper Arm Left Upper Arm   Patient Position: Sitting Supine Sitting Standing   BP Cuff Size: Medium Adult Medium Adult Medium Adult Medium Adult   Pulse: 78      Resp: 18      Temp: 98.6 °F (37 °C)      TempSrc: Temporal      SpO2: 99%      Weight: 60.5 kg (133 lb 6.4 oz)      Height: 1.549 m (5' 1\")            Coordination of Care Questionnaire:  :     \"Have you been to the ER, urgent care clinic since your last visit?  Hospitalized since your last visit?\"    NO    “Have you seen or consulted any other health care providers outside of [facility] since your last visit?”    NO    Have you had a mammogram?”   NO    No breast cancer screening on file             Click Here for Release of Records Request

## 2025-03-27 ENCOUNTER — TELEPHONE (OUTPATIENT)
Age: 40
End: 2025-03-27

## 2025-03-27 LAB
APPEARANCE UR: CLEAR
BACTERIA URNS QL MICRO: NEGATIVE /HPF
BILIRUB UR QL: NEGATIVE
COLOR UR: NORMAL
EPITH CASTS URNS QL MICRO: NORMAL /LPF
GLUCOSE UR STRIP.AUTO-MCNC: NEGATIVE MG/DL
HGB UR QL STRIP: NEGATIVE
HYALINE CASTS URNS QL MICRO: NORMAL /LPF (ref 0–5)
KETONES UR QL STRIP.AUTO: NEGATIVE MG/DL
LEUKOCYTE ESTERASE UR QL STRIP.AUTO: NEGATIVE
NITRITE UR QL STRIP.AUTO: NEGATIVE
PH UR STRIP: 8 (ref 5–8)
PROT UR STRIP-MCNC: NEGATIVE MG/DL
RBC #/AREA URNS HPF: NORMAL /HPF (ref 0–5)
SP GR UR REFRACTOMETRY: 1.02 (ref 1–1.03)
URINE CULTURE IF INDICATED: NORMAL
UROBILINOGEN UR QL STRIP.AUTO: 0.2 EU/DL (ref 0.2–1)
WBC URNS QL MICRO: NORMAL /HPF (ref 0–4)

## 2025-03-28 ENCOUNTER — RESULTS FOLLOW-UP (OUTPATIENT)
Age: 40
End: 2025-03-28

## 2025-07-25 ENCOUNTER — OFFICE VISIT (OUTPATIENT)
Age: 40
End: 2025-07-25
Payer: MEDICAID

## 2025-07-25 VITALS
WEIGHT: 133.2 LBS | HEART RATE: 85 BPM | TEMPERATURE: 98.2 F | OXYGEN SATURATION: 96 % | SYSTOLIC BLOOD PRESSURE: 110 MMHG | BODY MASS INDEX: 25.17 KG/M2 | DIASTOLIC BLOOD PRESSURE: 69 MMHG

## 2025-07-25 DIAGNOSIS — Z13.1 SCREENING FOR DIABETES MELLITUS: ICD-10-CM

## 2025-07-25 DIAGNOSIS — Z12.31 ENCOUNTER FOR SCREENING MAMMOGRAM FOR MALIGNANT NEOPLASM OF BREAST: ICD-10-CM

## 2025-07-25 DIAGNOSIS — Z13.220 SCREENING FOR HYPERLIPIDEMIA: ICD-10-CM

## 2025-07-25 DIAGNOSIS — M65.4 DE QUERVAIN'S TENOSYNOVITIS, RIGHT: ICD-10-CM

## 2025-07-25 DIAGNOSIS — M75.41 SUBACROMIAL IMPINGEMENT OF RIGHT SHOULDER: ICD-10-CM

## 2025-07-25 DIAGNOSIS — Z30.09 FAMILY PLANNING: ICD-10-CM

## 2025-07-25 DIAGNOSIS — Z00.00 ANNUAL PHYSICAL EXAM: Primary | ICD-10-CM

## 2025-07-25 DIAGNOSIS — Z23 ENCOUNTER FOR IMMUNIZATION: ICD-10-CM

## 2025-07-25 PROCEDURE — 99213 OFFICE O/P EST LOW 20 MIN: CPT

## 2025-07-25 PROCEDURE — 99396 PREV VISIT EST AGE 40-64: CPT

## 2025-07-25 PROCEDURE — 90746 HEPB VACCINE 3 DOSE ADULT IM: CPT

## 2025-07-25 PROCEDURE — PBSHW PBB SHADOW CHARGE

## 2025-07-25 ASSESSMENT — PATIENT HEALTH QUESTIONNAIRE - PHQ9
1. LITTLE INTEREST OR PLEASURE IN DOING THINGS: NOT AT ALL
SUM OF ALL RESPONSES TO PHQ QUESTIONS 1-9: 0
2. FEELING DOWN, DEPRESSED OR HOPELESS: NOT AT ALL
SUM OF ALL RESPONSES TO PHQ QUESTIONS 1-9: 0

## 2025-07-25 NOTE — CONSULTS
Session ID: [id number]  Session Duration: Longer than 54 minutes  Language: Macedonian   ID: #[id number]   Name: Mildred

## 2025-07-25 NOTE — PROGRESS NOTES
Identified pt with two pt identifiers(name and ). Reviewed record in preparation for visit and have obtained necessary documentation.  Chief Complaint   Patient presents with    Annual Exam     Pt here for annual physical. Pt reports pain in right arm x 1 week. Managing pain at home with motrin and tylenol. Pain 9/10. Pain extends from shoulder to hand. No lightheadedness, dizziness, headaches but does reports numbness and tingling in right hand/fingers.  Unable to  baby.         Vitals:    25 0849   BP: 110/69   BP Site: Left Lower Arm   Patient Position: Sitting   BP Cuff Size: Medium Adult   Pulse: 85   Temp: 98.2 °F (36.8 °C)   TempSrc: Oral   SpO2: 96%   Weight: 60.4 kg (133 lb 3.2 oz)         Coordination of Care Questionnaire:  :     \"Have you been to the ER, urgent care clinic since your last visit?  Hospitalized since your last visit?\"    NO    “Have you seen or consulted any other health care providers outside of [facility] since your last visit?”    NO    Have you had a mammogram?”   NO    No breast cancer screening on file             Click Here for Release of Records Request

## 2025-07-25 NOTE — PROGRESS NOTES
[facility]       Agustin Contreras is a 39 y.o. female  Presenting for her annual checkup + acute/chronic complaints as below    Right shoulder pain   Onset: 1 week   Pain present when wake up & when picking up daughter   Some numbness & tingling in hand & fingers   Never happened before   Tried: tylenol & ibuprofen     Last mammogram : none  Last PAP/pelvic : NILM HPV neg 6/19/24  Depression: PHQ-9 Total Score: 0 (7/25/2025  8:57 AM)    Health Maintenance   Topic Date Due    Diabetes screen  Never done    Lipids  Never done    Breast cancer screen  Never done    COVID-19 Vaccine (1 - 2024-25 season) 07/25/2026 (Originally 9/1/2024)    Flu vaccine (1) 08/01/2025    Depression Screen  07/25/2026    Cervical cancer screen  06/19/2029    DTaP/Tdap/Td vaccine (3 - Td or Tdap) 11/25/2034    Hepatitis B vaccine  Completed    Hepatitis C screen  Completed    HIV screen  Completed    Hepatitis A vaccine  Aged Out    Hib vaccine  Aged Out    HPV vaccine  Aged Out    Polio vaccine  Aged Out    Meningococcal (ACWY) vaccine  Aged Out    Meningococcal B vaccine  Aged Out    Pneumococcal 0-49 years Vaccine  Aged Out    Varicella vaccine  Discontinued       Exercise: at this time children keeping patient active   Diet: balanced diet   Alcohol: no   Tobacco use: no  Tobacco use history: never   Recreational drug use: none    Vaccinations reviewed  Immunization History   Administered Date(s) Administered    Hep B, ENGERIX-B, (age 20y+), IM, 1mL 08/12/2024, 09/09/2024, 07/25/2025    Influenza, FLUARIX, FLULAVAL, FLUZONE (age 6 mo+) and AFLURIA, (age 3 y+), Quadv PF, 0.5mL 02/08/2019, 09/05/2019    TDaP, ADACEL (age 10y-64y), BOOSTRIX (age 10y+), IM, 0.5mL 04/26/2019, 11/25/2024       Allergies: Patient has no known allergies.  Current Outpatient Medications   Medication Sig    medroxyPROGESTERone (DEPO-PROVERA) 150 MG/ML injection Inject 1 mL into the muscle every 3 months

## 2025-07-26 LAB
CHOLEST SERPL-MCNC: 145 MG/DL
EST. AVERAGE GLUCOSE BLD GHB EST-MCNC: 91 MG/DL
HBA1C MFR BLD: 4.8 % (ref 4–5.6)
HDLC SERPL-MCNC: 44 MG/DL
HDLC SERPL: 3.3 (ref 0–5)
LDLC SERPL CALC-MCNC: 90.6 MG/DL (ref 0–100)
TRIGL SERPL-MCNC: 52 MG/DL
VLDLC SERPL CALC-MCNC: 10.4 MG/DL

## 2025-07-28 ENCOUNTER — RESULTS FOLLOW-UP (OUTPATIENT)
Age: 40
End: 2025-07-28

## 2025-07-28 NOTE — TELEPHONE ENCOUNTER
[facility] Residency     Called patient with interpretation to discuss lab results. Unable to leave HIPAA compliant voicemail. Will send letter with results.     Briana Woody MD